# Patient Record
Sex: FEMALE | Race: BLACK OR AFRICAN AMERICAN | NOT HISPANIC OR LATINO | Employment: OTHER | URBAN - METROPOLITAN AREA
[De-identification: names, ages, dates, MRNs, and addresses within clinical notes are randomized per-mention and may not be internally consistent; named-entity substitution may affect disease eponyms.]

---

## 2023-11-07 DIAGNOSIS — M17.12 ARTHRITIS OF KNEE, LEFT: ICD-10-CM

## 2023-11-11 PROBLEM — M17.12 ARTHRITIS OF KNEE, LEFT: Status: ACTIVE | Noted: 2023-11-07

## 2023-11-14 ENCOUNTER — ANCILLARY PROCEDURE (OUTPATIENT)
Dept: RADIOLOGY | Facility: CLINIC | Age: 73
End: 2023-11-14
Payer: MEDICARE

## 2023-11-14 DIAGNOSIS — M17.12 ARTHRITIS OF KNEE, LEFT: ICD-10-CM

## 2023-11-14 PROCEDURE — 77073 BONE LENGTH STUDIES: CPT

## 2023-11-14 PROCEDURE — 77073 BONE LENGTH STUDIES: CPT | Performed by: RADIOLOGY

## 2023-11-14 PROCEDURE — 73564 X-RAY EXAM KNEE 4 OR MORE: CPT | Mod: LT,FY

## 2023-11-14 PROCEDURE — 73564 X-RAY EXAM KNEE 4 OR MORE: CPT | Mod: LEFT SIDE | Performed by: RADIOLOGY

## 2023-11-21 ENCOUNTER — TELEPHONE (OUTPATIENT)
Dept: ORTHOPEDIC SURGERY | Facility: HOSPITAL | Age: 73
End: 2023-11-21
Payer: MEDICARE

## 2023-11-21 NOTE — TELEPHONE ENCOUNTER
Thank you for taking my call today.  All questions were answered at the time of the call, but please feel free to reach out to me via [employer] or phone, [phone], with any new questions or concerns.       We confirmed that you opted to enroll in our [id number] program so your discharge prescriptions will be available to take home at the time of discharge.       We confirmed that your plan would be to Discharge Home same day (Rapid Recovery).    We confirmed that you have DME needed for recovery. And are working with your insurance company for approval of a cold therapy machine.     Use the provided body wash for 4 days before surgery and complete a 5th shower on the morning of surgery, this includes your body and hair.  Follow the directions as provided during preadmission testing.  The mouth wash will be used the night before and the morning of surgery.       As a reminder, if you do not hear from our team, please call [phone] between 2pm and 3pm the business day before your surgery to confirm your arrival time and details.        Please don't hesitate to reach out with additional questions or concerns.    Polina Diallo MBA, BSN, RN-BC  Orthopedic   [facility]  [phone]

## 2023-11-29 ENCOUNTER — ANESTHESIA EVENT (OUTPATIENT)
Dept: OPERATING ROOM | Facility: HOSPITAL | Age: 73
End: 2023-11-29
Payer: MEDICARE

## 2023-11-30 ENCOUNTER — PRE-ADMISSION TESTING (OUTPATIENT)
Dept: PREADMISSION TESTING | Facility: HOSPITAL | Age: 73
End: 2023-11-30
Payer: MEDICARE

## 2023-11-30 VITALS
OXYGEN SATURATION: 98 % | WEIGHT: 197.09 LBS | HEART RATE: 67 BPM | RESPIRATION RATE: 16 BRPM | HEIGHT: 70 IN | TEMPERATURE: 96.5 F | SYSTOLIC BLOOD PRESSURE: 158 MMHG | DIASTOLIC BLOOD PRESSURE: 84 MMHG | BODY MASS INDEX: 28.22 KG/M2

## 2023-11-30 DIAGNOSIS — Z01.818 PREOPERATIVE TESTING: Primary | ICD-10-CM

## 2023-11-30 LAB
ANION GAP SERPL CALC-SCNC: 14 MMOL/L (ref 10–20)
APPEARANCE UR: CLEAR
BASOPHILS # BLD AUTO: 0.03 X10*3/UL (ref 0–0.1)
BASOPHILS NFR BLD AUTO: 0.7 %
BILIRUB UR STRIP.AUTO-MCNC: NEGATIVE MG/DL
BUN SERPL-MCNC: 13 MG/DL (ref 6–23)
CALCIUM SERPL-MCNC: 9.6 MG/DL (ref 8.6–10.3)
CHLORIDE SERPL-SCNC: 101 MMOL/L (ref 98–107)
CO2 SERPL-SCNC: 28 MMOL/L (ref 21–32)
COLOR UR: YELLOW
CREAT SERPL-MCNC: 0.84 MG/DL (ref 0.5–1.05)
EOSINOPHIL # BLD AUTO: 0.08 X10*3/UL (ref 0–0.4)
EOSINOPHIL NFR BLD AUTO: 1.8 %
ERYTHROCYTE [DISTWIDTH] IN BLOOD BY AUTOMATED COUNT: 13.7 % (ref 11.5–14.5)
GFR SERPL CREATININE-BSD FRML MDRD: 74 ML/MIN/1.73M*2
GLUCOSE SERPL-MCNC: 94 MG/DL (ref 74–99)
GLUCOSE UR STRIP.AUTO-MCNC: NEGATIVE MG/DL
HCT VFR BLD AUTO: 42.8 % (ref 36–46)
HGB BLD-MCNC: 14.4 G/DL (ref 12–16)
HOLD SPECIMEN: NORMAL
IMM GRANULOCYTES # BLD AUTO: 0 X10*3/UL (ref 0–0.5)
IMM GRANULOCYTES NFR BLD AUTO: 0 % (ref 0–0.9)
KETONES UR STRIP.AUTO-MCNC: NEGATIVE MG/DL
LEUKOCYTE ESTERASE UR QL STRIP.AUTO: NEGATIVE
LYMPHOCYTES # BLD AUTO: 1.04 X10*3/UL (ref 0.8–3)
LYMPHOCYTES NFR BLD AUTO: 23.8 %
MCH RBC QN AUTO: 33.4 PG (ref 26–34)
MCHC RBC AUTO-ENTMCNC: 33.6 G/DL (ref 32–36)
MCV RBC AUTO: 99 FL (ref 80–100)
MONOCYTES # BLD AUTO: 0.64 X10*3/UL (ref 0.05–0.8)
MONOCYTES NFR BLD AUTO: 14.6 %
NEUTROPHILS # BLD AUTO: 2.58 X10*3/UL (ref 1.6–5.5)
NEUTROPHILS NFR BLD AUTO: 59.1 %
NITRITE UR QL STRIP.AUTO: NEGATIVE
NRBC BLD-RTO: NORMAL /100{WBCS}
PH UR STRIP.AUTO: 6 [PH]
PLATELET # BLD AUTO: 259 X10*3/UL (ref 150–450)
POTASSIUM SERPL-SCNC: 3.7 MMOL/L (ref 3.5–5.3)
PROT UR STRIP.AUTO-MCNC: NEGATIVE MG/DL
RBC # BLD AUTO: 4.31 X10*6/UL (ref 4–5.2)
RBC # UR STRIP.AUTO: ABNORMAL /UL
RBC #/AREA URNS AUTO: NORMAL /HPF
SODIUM SERPL-SCNC: 139 MMOL/L (ref 136–145)
SP GR UR STRIP.AUTO: 1.01
SQUAMOUS #/AREA URNS AUTO: NORMAL /HPF
UROBILINOGEN UR STRIP.AUTO-MCNC: 0.2 MG/DL
WBC # BLD AUTO: 4.4 X10*3/UL (ref 4.4–11.3)
WBC #/AREA URNS AUTO: NORMAL /HPF

## 2023-11-30 PROCEDURE — 80048 BASIC METABOLIC PNL TOTAL CA: CPT

## 2023-11-30 PROCEDURE — 36415 COLL VENOUS BLD VENIPUNCTURE: CPT

## 2023-11-30 PROCEDURE — 99204 OFFICE O/P NEW MOD 45 MIN: CPT | Performed by: NURSE PRACTITIONER

## 2023-11-30 PROCEDURE — 87081 CULTURE SCREEN ONLY: CPT

## 2023-11-30 PROCEDURE — 93005 ELECTROCARDIOGRAM TRACING: CPT

## 2023-11-30 PROCEDURE — 81001 URINALYSIS AUTO W/SCOPE: CPT

## 2023-11-30 PROCEDURE — 85025 COMPLETE CBC W/AUTO DIFF WBC: CPT

## 2023-11-30 RX ORDER — FLUTICASONE PROPIONATE 50 MCG
1 SPRAY, SUSPENSION (ML) NASAL DAILY PRN
COMMUNITY

## 2023-11-30 RX ORDER — BISMUTH SUBSALICYLATE 262 MG
1 TABLET,CHEWABLE ORAL DAILY
COMMUNITY

## 2023-11-30 RX ORDER — LOSARTAN POTASSIUM AND HYDROCHLOROTHIAZIDE 12.5; 5 MG/1; MG/1
1 TABLET ORAL DAILY
COMMUNITY

## 2023-11-30 RX ORDER — CHLORHEXIDINE GLUCONATE ORAL RINSE 1.2 MG/ML
15 SOLUTION DENTAL AS NEEDED
Qty: 30 ML | Refills: 0 | Status: SHIPPED | OUTPATIENT
Start: 2023-11-30 | End: 2023-12-15 | Stop reason: HOSPADM

## 2023-11-30 RX ORDER — IBUPROFEN 200 MG
2 TABLET ORAL EVERY 6 HOURS PRN
COMMUNITY

## 2023-11-30 RX ORDER — LORATADINE 10 MG/1
1 TABLET ORAL DAILY PRN
COMMUNITY

## 2023-11-30 RX ORDER — CHOLECALCIFEROL (VITAMIN D3) 50 MCG
1 TABLET ORAL DAILY
COMMUNITY

## 2023-11-30 ASSESSMENT — PAIN SCALES - GENERAL: PAINLEVEL_OUTOF10: 0 - NO PAIN

## 2023-11-30 ASSESSMENT — PAIN - FUNCTIONAL ASSESSMENT: PAIN_FUNCTIONAL_ASSESSMENT: 0-10

## 2023-11-30 ASSESSMENT — PAIN DESCRIPTION - DESCRIPTORS: DESCRIPTORS: ACHING

## 2023-11-30 NOTE — CPM/PAT H&P
CPM/PAT Evaluation     Megha Vivas is a 73 y.o. female   Chief Complaint: knee pain    HPI:  Patient is a 72 y/o alert and oriented female coming in for PAT for a scheduled Left Total Knee Arthroplasty on 12/1/23 w/ Dr. Simon.  The patient reports she has 3/10 achy knee pain that is exacerbated by walking.  She states wears a brace which helps.  She denies any swelling.  Reports her knee will give out if she does not wear her brace.  She has had an injection her that knee and did physical therapy.  Patient denies chest pain, SOB, LOPEZ and NVDC.  Patient also denies Hx: DVT/PE.  Current medications were reviewed and a presurgical mediation schedule was provided.  She has no questions at this time.   Past Medical History:   Diagnosis Date    Cataract     Hard to intubate     Hypertension     Vertigo       Past Surgical History:   Procedure Laterality Date    CATARACT EXTRACTION Left     HYSTERECTOMY      OTHER SURGICAL HISTORY      left victrectomy    OTHER SURGICAL HISTORY      bilateral breast reduction        Allergies   Allergen Reactions    Nickel Itching and Swelling    Pollen Extracts Cough and Runny nose        Vitals:    11/30/23 1208   BP: 158/84   Pulse: 67   Resp: 16   Temp: 35.8 °C (96.5 °F)   SpO2: 98%     Review of Systems   Musculoskeletal:         See hpi for details   All other systems reviewed and are negative.     Physical Exam  Vitals and nursing note reviewed.   Constitutional:       Appearance: Normal appearance.   HENT:      Head: Normocephalic and atraumatic.      Mouth/Throat:      Mouth: Mucous membranes are moist.      Pharynx: Oropharynx is clear.   Eyes:      Pupils: Pupils are equal, round, and reactive to light.   Cardiovascular:      Rate and Rhythm: Normal rate and regular rhythm.      Heart sounds: Normal heart sounds.      Comments: EKG today is NSR w/ left axis deviation.   Pulmonary:      Effort: Pulmonary effort is normal.      Breath sounds: Normal breath sounds.    Abdominal:      General: Bowel sounds are normal.   Musculoskeletal:         General: Normal range of motion.      Cervical back: Normal range of motion and neck supple.   Skin:     General: Skin is warm and dry.   Neurological:      Mental Status: She is alert and oriented to person, place, and time.   Psychiatric:         Mood and Affect: Mood normal.         Behavior: Behavior normal.         Thought Content: Thought content normal.         Judgment: Judgment normal.        PAT AIRWAY:   Airway:     Mallampati::  IV    TM distance::  >3 FB    Neck ROM::  Full  Has 1 dental implant   Has bridge and 2 missing teeth    Reports she is a difficult intubation    Assessment and Plan:   Arthritis left knee  Arthroplasty total knee  Managed with ibuprofen prn    Hypertension  Managed with losartan-hydrochlorothiazide 50-12.5mg daily    ASA II  RCRI - 0 points  Class I Risk 3.9%  SINDI - 2 points low Risk for TIESHA   NSQIP - Predicted length of stay 0 days  ARISCAT - 3 points Low Risk 1.6%  DASI 42.7 Points 7.99 Mets  BERYL - 0.2%  JHFRAT -4  points low  risk for falls  Clearance - not indicated  PAT Testing - CBC, CMP, UA, MRSA PCR, EKG    CHLORHEXIDINE .12% DENTAL RINSE E PRESCIRBED PER  INFECTION PREVENTION PROTOCOL. PATIENT EDUCATED     Face to Face patient contact time 20 minutes    ANTONIO Rowe-CNP 11/30/2023 12:31 PM

## 2023-11-30 NOTE — PREPROCEDURE INSTRUCTIONS
Medication List            Accurate as of November 30, 2023 12:27 PM. Always use your most recent med list.                cholecalciferol 50 MCG (2000 UT) tablet  Commonly known as: Vitamin D-3  Medication Adjustments for Surgery: Stop 7 days before surgery     fluticasone 50 mcg/actuation nasal spray  Commonly known as: Flonase  Medication Adjustments for Surgery: Take morning of surgery with sip of water, no other fluids     ibuprofen 200 mg tablet  Medication Adjustments for Surgery: Other (Comment)  Notes to patient: Stop 5 days prior to surgery     loratadine 10 mg tablet  Commonly known as: Claritin  Medication Adjustments for Surgery: Take morning of surgery with sip of water, no other fluids     losartan-hydrochlorothiazide 50-12.5 mg tablet  Commonly known as: Hyzaar  Medication Adjustments for Surgery: Stop 1 day before surgery     MSM ORAL  Medication Adjustments for Surgery: Stop 7 days before surgery     multivitamin tablet  Medication Adjustments for Surgery: Stop 7 days before surgery     TUMERIC-GING-OLIVE-OREG-CAPRYL ORAL  Medication Adjustments for Surgery: Stop 7 days before surgery                              NPO Instructions:    Do not eat any food after midnight the night before your surgery/procedure.    Additional Instructions:     Seven/Six Days before Surgery:  Review your medication instructions, stop indicated medications  Five Days before Surgery:  Review your medication instructions, stop indicated medications  Three Days before Surgery:  Review your medication instructions, stop indicated medications  The Day before Surgery:  Review your medication instructions, stop indicated medications  You will be contacted regarding the time of your arrival to facility and surgery time  Do not eat any food after Midnight  Day of Surgery:  Review your medication instructions, take indicated medications  Wear  comfortable loose fitting clothing  Do not use moisturizers, creams, lotions or  perfume  All jewelry and valuables should be left at home      Follow written instructions given and explained for CHG wash and dental rinse.

## 2023-11-30 NOTE — H&P (VIEW-ONLY)
CPM/PAT Evaluation     Megha Vivas is a 73 y.o. female   Chief Complaint: knee pain    HPI:  Patient is a 74 y/o alert and oriented female coming in for PAT for a scheduled Left Total Knee Arthroplasty on 12/1/23 w/ Dr. Simon.  The patient reports she has 3/10 achy knee pain that is exacerbated by walking.  She states wears a brace which helps.  She denies any swelling.  Reports her knee will give out if she does not wear her brace.  She has had an injection her that knee and did physical therapy.  Patient denies chest pain, SOB, LOPEZ and NVDC.  Patient also denies Hx: DVT/PE.  Current medications were reviewed and a presurgical mediation schedule was provided.  She has no questions at this time.   Past Medical History:   Diagnosis Date    Cataract     Hard to intubate     Hypertension     Vertigo       Past Surgical History:   Procedure Laterality Date    CATARACT EXTRACTION Left     HYSTERECTOMY      OTHER SURGICAL HISTORY      left victrectomy    OTHER SURGICAL HISTORY      bilateral breast reduction        Allergies   Allergen Reactions    Nickel Itching and Swelling    Pollen Extracts Cough and Runny nose        Vitals:    11/30/23 1208   BP: 158/84   Pulse: 67   Resp: 16   Temp: 35.8 °C (96.5 °F)   SpO2: 98%     Review of Systems   Musculoskeletal:         See hpi for details   All other systems reviewed and are negative.     Physical Exam  Vitals and nursing note reviewed.   Constitutional:       Appearance: Normal appearance.   HENT:      Head: Normocephalic and atraumatic.      Mouth/Throat:      Mouth: Mucous membranes are moist.      Pharynx: Oropharynx is clear.   Eyes:      Pupils: Pupils are equal, round, and reactive to light.   Cardiovascular:      Rate and Rhythm: Normal rate and regular rhythm.      Heart sounds: Normal heart sounds.      Comments: EKG today is NSR w/ left axis deviation.   Pulmonary:      Effort: Pulmonary effort is normal.      Breath sounds: Normal breath sounds.    Abdominal:      General: Bowel sounds are normal.   Musculoskeletal:         General: Normal range of motion.      Cervical back: Normal range of motion and neck supple.   Skin:     General: Skin is warm and dry.   Neurological:      Mental Status: She is alert and oriented to person, place, and time.   Psychiatric:         Mood and Affect: Mood normal.         Behavior: Behavior normal.         Thought Content: Thought content normal.         Judgment: Judgment normal.        PAT AIRWAY:   Airway:     Mallampati::  IV    TM distance::  >3 FB    Neck ROM::  Full  Has 1 dental implant   Has bridge and 2 missing teeth    Reports she is a difficult intubation    Assessment and Plan:   Arthritis left knee  Arthroplasty total knee  Managed with ibuprofen prn    Hypertension  Managed with losartan-hydrochlorothiazide 50-12.5mg daily    ASA II  RCRI - 0 points  Class I Risk 3.9%  SINDI - 2 points low Risk for TIESHA   NSQIP - Predicted length of stay 0 days  ARISCAT - 3 points Low Risk 1.6%  DASI 42.7 Points 7.99 Mets  BERYL - 0.2%  JHFRAT -4  points low  risk for falls  Clearance - not indicated  PAT Testing - CBC, CMP, UA, MRSA PCR, EKG    CHLORHEXIDINE .12% DENTAL RINSE E PRESCIRBED PER  INFECTION PREVENTION PROTOCOL. PATIENT EDUCATED     Face to Face patient contact time 20 minutes    ANTONIO Rowe-CNP 11/30/2023 12:31 PM

## 2023-12-01 ENCOUNTER — ANESTHESIA (OUTPATIENT)
Dept: OPERATING ROOM | Facility: HOSPITAL | Age: 73
End: 2023-12-01
Payer: MEDICARE

## 2023-12-01 LAB
ATRIAL RATE: 61 BPM
P AXIS: 48 DEGREES
P OFFSET: 198 MS
P ONSET: 145 MS
PR INTERVAL: 162 MS
Q ONSET: 226 MS
QRS COUNT: 10 BEATS
QRS DURATION: 76 MS
QT INTERVAL: 410 MS
QTC CALCULATION(BAZETT): 412 MS
QTC FREDERICIA: 412 MS
R AXIS: -32 DEGREES
T AXIS: 32 DEGREES
T OFFSET: 431 MS
VENTRICULAR RATE: 61 BPM

## 2023-12-02 LAB — STAPHYLOCOCCUS SPEC CULT: NORMAL

## 2023-12-04 DIAGNOSIS — M17.12 PRIMARY OSTEOARTHRITIS OF LEFT KNEE: Primary | ICD-10-CM

## 2023-12-13 ENCOUNTER — ANESTHESIA EVENT (OUTPATIENT)
Dept: OPERATING ROOM | Facility: HOSPITAL | Age: 73
End: 2023-12-13
Payer: MEDICARE

## 2023-12-14 RX ORDER — SENNOSIDES 8.6 MG/1
1 TABLET ORAL DAILY
Qty: 15 TABLET | Refills: 0 | Status: SHIPPED | OUTPATIENT
Start: 2023-12-14 | End: 2023-12-30

## 2023-12-14 RX ORDER — OXYCODONE HYDROCHLORIDE 5 MG/1
5-10 TABLET ORAL EVERY 6 HOURS PRN
Qty: 40 TABLET | Refills: 0 | Status: SHIPPED | OUTPATIENT
Start: 2023-12-14 | End: 2023-12-22

## 2023-12-14 RX ORDER — DOCUSATE SODIUM 100 MG/1
100 CAPSULE, LIQUID FILLED ORAL 2 TIMES DAILY
Qty: 30 CAPSULE | Refills: 0 | Status: SHIPPED | OUTPATIENT
Start: 2023-12-14 | End: 2023-12-30

## 2023-12-14 RX ORDER — ACETAMINOPHEN 500 MG
1000 TABLET ORAL EVERY 8 HOURS
Qty: 60 TABLET | Refills: 1 | Status: SHIPPED | OUTPATIENT
Start: 2023-12-14 | End: 2024-01-04

## 2023-12-14 RX ORDER — NAPROXEN SODIUM 220 MG/1
81 TABLET, FILM COATED ORAL 2 TIMES DAILY
Qty: 60 TABLET | Refills: 0 | Status: SHIPPED | OUTPATIENT
Start: 2023-12-14 | End: 2024-01-14

## 2023-12-14 RX ORDER — ONDANSETRON 4 MG/1
4 TABLET, FILM COATED ORAL EVERY 8 HOURS PRN
Qty: 20 TABLET | Refills: 0 | Status: SHIPPED | OUTPATIENT
Start: 2023-12-14

## 2023-12-14 RX ORDER — TRAMADOL HYDROCHLORIDE 50 MG/1
50-100 TABLET ORAL EVERY 6 HOURS PRN
Qty: 40 TABLET | Refills: 0 | Status: SHIPPED | OUTPATIENT
Start: 2023-12-14 | End: 2023-12-21

## 2023-12-14 RX ORDER — CEFADROXIL 500 MG/1
500 CAPSULE ORAL 2 TIMES DAILY
Qty: 10 CAPSULE | Refills: 0 | Status: SHIPPED | OUTPATIENT
Start: 2023-12-14 | End: 2023-12-20

## 2023-12-14 RX ORDER — PANTOPRAZOLE SODIUM 40 MG/1
40 TABLET, DELAYED RELEASE ORAL
Qty: 30 TABLET | Refills: 0 | Status: SHIPPED | OUTPATIENT
Start: 2023-12-14 | End: 2024-01-14

## 2023-12-15 ENCOUNTER — PHARMACY VISIT (OUTPATIENT)
Dept: PHARMACY | Facility: CLINIC | Age: 73
End: 2023-12-15
Payer: MEDICARE

## 2023-12-15 ENCOUNTER — ANESTHESIA (OUTPATIENT)
Dept: OPERATING ROOM | Facility: HOSPITAL | Age: 73
End: 2023-12-15
Payer: MEDICARE

## 2023-12-15 ENCOUNTER — APPOINTMENT (OUTPATIENT)
Dept: RADIOLOGY | Facility: HOSPITAL | Age: 73
End: 2023-12-15
Payer: MEDICARE

## 2023-12-15 ENCOUNTER — HOSPITAL ENCOUNTER (OUTPATIENT)
Facility: HOSPITAL | Age: 73
Setting detail: OUTPATIENT SURGERY
Discharge: HOME | End: 2023-12-15
Attending: STUDENT IN AN ORGANIZED HEALTH CARE EDUCATION/TRAINING PROGRAM | Admitting: STUDENT IN AN ORGANIZED HEALTH CARE EDUCATION/TRAINING PROGRAM
Payer: MEDICARE

## 2023-12-15 VITALS
HEIGHT: 69 IN | WEIGHT: 194.45 LBS | DIASTOLIC BLOOD PRESSURE: 71 MMHG | TEMPERATURE: 97.3 F | BODY MASS INDEX: 28.8 KG/M2 | OXYGEN SATURATION: 96 % | HEART RATE: 56 BPM | RESPIRATION RATE: 12 BRPM | SYSTOLIC BLOOD PRESSURE: 133 MMHG

## 2023-12-15 DIAGNOSIS — M17.12 PRIMARY OSTEOARTHRITIS OF LEFT KNEE: Primary | ICD-10-CM

## 2023-12-15 PROCEDURE — C1776 JOINT DEVICE (IMPLANTABLE): HCPCS | Performed by: STUDENT IN AN ORGANIZED HEALTH CARE EDUCATION/TRAINING PROGRAM

## 2023-12-15 PROCEDURE — 7100000009 HC PHASE TWO TIME - INITIAL BASE CHARGE: Performed by: STUDENT IN AN ORGANIZED HEALTH CARE EDUCATION/TRAINING PROGRAM

## 2023-12-15 PROCEDURE — C1713 ANCHOR/SCREW BN/BN,TIS/BN: HCPCS | Performed by: STUDENT IN AN ORGANIZED HEALTH CARE EDUCATION/TRAINING PROGRAM

## 2023-12-15 PROCEDURE — 2720000007 HC OR 272 NO HCPCS: Performed by: STUDENT IN AN ORGANIZED HEALTH CARE EDUCATION/TRAINING PROGRAM

## 2023-12-15 PROCEDURE — RXMED WILLOW AMBULATORY MEDICATION CHARGE

## 2023-12-15 PROCEDURE — 2500000001 HC RX 250 WO HCPCS SELF ADMINISTERED DRUGS (ALT 637 FOR MEDICARE OP): Performed by: ANESTHESIOLOGY

## 2023-12-15 PROCEDURE — 2500000001 HC RX 250 WO HCPCS SELF ADMINISTERED DRUGS (ALT 637 FOR MEDICARE OP): Performed by: STUDENT IN AN ORGANIZED HEALTH CARE EDUCATION/TRAINING PROGRAM

## 2023-12-15 PROCEDURE — 64450 NJX AA&/STRD OTHER PN/BRANCH: CPT | Performed by: ANESTHESIOLOGY

## 2023-12-15 PROCEDURE — 97110 THERAPEUTIC EXERCISES: CPT | Mod: GP

## 2023-12-15 PROCEDURE — 2500000004 HC RX 250 GENERAL PHARMACY W/ HCPCS (ALT 636 FOR OP/ED): Performed by: STUDENT IN AN ORGANIZED HEALTH CARE EDUCATION/TRAINING PROGRAM

## 2023-12-15 PROCEDURE — 3600000018 HC OR TIME - INITIAL BASE CHARGE - PROCEDURE LEVEL SIX: Performed by: STUDENT IN AN ORGANIZED HEALTH CARE EDUCATION/TRAINING PROGRAM

## 2023-12-15 PROCEDURE — 97161 PT EVAL LOW COMPLEX 20 MIN: CPT | Mod: GP

## 2023-12-15 PROCEDURE — 97530 THERAPEUTIC ACTIVITIES: CPT | Mod: GP

## 2023-12-15 PROCEDURE — 64447 NJX AA&/STRD FEMORAL NRV IMG: CPT | Performed by: ANESTHESIOLOGY

## 2023-12-15 PROCEDURE — 3600000017 HC OR TIME - EACH INCREMENTAL 1 MINUTE - PROCEDURE LEVEL SIX: Performed by: STUDENT IN AN ORGANIZED HEALTH CARE EDUCATION/TRAINING PROGRAM

## 2023-12-15 PROCEDURE — A27447 PR TOTAL KNEE ARTHROPLASTY: Performed by: ANESTHESIOLOGIST ASSISTANT

## 2023-12-15 PROCEDURE — 27447 TOTAL KNEE ARTHROPLASTY: CPT | Performed by: STUDENT IN AN ORGANIZED HEALTH CARE EDUCATION/TRAINING PROGRAM

## 2023-12-15 PROCEDURE — A4649 SURGICAL SUPPLIES: HCPCS | Performed by: STUDENT IN AN ORGANIZED HEALTH CARE EDUCATION/TRAINING PROGRAM

## 2023-12-15 PROCEDURE — 73560 X-RAY EXAM OF KNEE 1 OR 2: CPT | Mod: LT,FY

## 2023-12-15 PROCEDURE — 2500000005 HC RX 250 GENERAL PHARMACY W/O HCPCS: Performed by: STUDENT IN AN ORGANIZED HEALTH CARE EDUCATION/TRAINING PROGRAM

## 2023-12-15 PROCEDURE — 2500000005 HC RX 250 GENERAL PHARMACY W/O HCPCS: Performed by: ANESTHESIOLOGIST ASSISTANT

## 2023-12-15 PROCEDURE — 99100 ANES PT EXTEME AGE<1 YR&>70: CPT | Performed by: ANESTHESIOLOGY

## 2023-12-15 PROCEDURE — A27447 PR TOTAL KNEE ARTHROPLASTY: Performed by: ANESTHESIOLOGY

## 2023-12-15 PROCEDURE — 7100000010 HC PHASE TWO TIME - EACH INCREMENTAL 1 MINUTE: Performed by: STUDENT IN AN ORGANIZED HEALTH CARE EDUCATION/TRAINING PROGRAM

## 2023-12-15 PROCEDURE — 3700000001 HC GENERAL ANESTHESIA TIME - INITIAL BASE CHARGE: Performed by: STUDENT IN AN ORGANIZED HEALTH CARE EDUCATION/TRAINING PROGRAM

## 2023-12-15 PROCEDURE — 3700000002 HC GENERAL ANESTHESIA TIME - EACH INCREMENTAL 1 MINUTE: Performed by: STUDENT IN AN ORGANIZED HEALTH CARE EDUCATION/TRAINING PROGRAM

## 2023-12-15 PROCEDURE — 2500000004 HC RX 250 GENERAL PHARMACY W/ HCPCS (ALT 636 FOR OP/ED): Performed by: ANESTHESIOLOGIST ASSISTANT

## 2023-12-15 PROCEDURE — 97116 GAIT TRAINING THERAPY: CPT | Mod: GP

## 2023-12-15 PROCEDURE — 2500000004 HC RX 250 GENERAL PHARMACY W/ HCPCS (ALT 636 FOR OP/ED): Performed by: ANESTHESIOLOGY

## 2023-12-15 PROCEDURE — 2780000003 HC OR 278 NO HCPCS: Performed by: STUDENT IN AN ORGANIZED HEALTH CARE EDUCATION/TRAINING PROGRAM

## 2023-12-15 DEVICE — IMPLANTABLE DEVICE
Type: IMPLANTABLE DEVICE | Site: KNEE | Status: FUNCTIONAL
Brand: PERSONA®

## 2023-12-15 DEVICE — IMPLANTABLE DEVICE
Type: IMPLANTABLE DEVICE | Site: KNEE | Status: FUNCTIONAL
Brand: PERSONA® NATURAL TIBIA®

## 2023-12-15 DEVICE — IMPLANTABLE DEVICE
Type: IMPLANTABLE DEVICE | Site: KNEE | Status: FUNCTIONAL
Brand: PERSONA™

## 2023-12-15 DEVICE — TOBRA FULL DOSE ANTIBIOTIC BONE CEMENT, 10 PACK CATALOG NUMBER IS 6197-9-010
Type: IMPLANTABLE DEVICE | Site: KNEE | Status: FUNCTIONAL
Brand: SIMPLEX

## 2023-12-15 DEVICE — IMPLANTABLE DEVICE
Type: IMPLANTABLE DEVICE | Site: KNEE | Status: FUNCTIONAL
Brand: NEXGEN®

## 2023-12-15 RX ORDER — ONDANSETRON HYDROCHLORIDE 2 MG/ML
4 INJECTION, SOLUTION INTRAVENOUS ONCE AS NEEDED
Status: DISCONTINUED | OUTPATIENT
Start: 2023-12-15 | End: 2023-12-15 | Stop reason: HOSPADM

## 2023-12-15 RX ORDER — KETOROLAC TROMETHAMINE 30 MG/ML
INJECTION, SOLUTION INTRAMUSCULAR; INTRAVENOUS AS NEEDED
Status: DISCONTINUED | OUTPATIENT
Start: 2023-12-15 | End: 2023-12-15

## 2023-12-15 RX ORDER — TRANEXAMIC ACID 100 MG/ML
INJECTION, SOLUTION INTRAVENOUS AS NEEDED
Status: DISCONTINUED | OUTPATIENT
Start: 2023-12-15 | End: 2023-12-15

## 2023-12-15 RX ORDER — FENTANYL CITRATE 50 UG/ML
INJECTION, SOLUTION INTRAMUSCULAR; INTRAVENOUS AS NEEDED
Status: DISCONTINUED | OUTPATIENT
Start: 2023-12-15 | End: 2023-12-15

## 2023-12-15 RX ORDER — NALOXONE HYDROCHLORIDE 0.4 MG/ML
0.2 INJECTION, SOLUTION INTRAMUSCULAR; INTRAVENOUS; SUBCUTANEOUS EVERY 5 MIN PRN
Status: CANCELLED | OUTPATIENT
Start: 2023-12-15

## 2023-12-15 RX ORDER — PANTOPRAZOLE SODIUM 40 MG/1
40 TABLET, DELAYED RELEASE ORAL
Status: CANCELLED | OUTPATIENT
Start: 2023-12-16 | End: 2024-01-06

## 2023-12-15 RX ORDER — LABETALOL HYDROCHLORIDE 5 MG/ML
5 INJECTION, SOLUTION INTRAVENOUS EVERY 5 MIN PRN
Status: DISCONTINUED | OUTPATIENT
Start: 2023-12-15 | End: 2023-12-15 | Stop reason: HOSPADM

## 2023-12-15 RX ORDER — CELECOXIB 200 MG/1
200 CAPSULE ORAL ONCE
Status: COMPLETED | OUTPATIENT
Start: 2023-12-15 | End: 2023-12-15

## 2023-12-15 RX ORDER — FENTANYL CITRATE 50 UG/ML
50 INJECTION, SOLUTION INTRAMUSCULAR; INTRAVENOUS EVERY 5 MIN PRN
Status: DISCONTINUED | OUTPATIENT
Start: 2023-12-15 | End: 2023-12-15 | Stop reason: HOSPADM

## 2023-12-15 RX ORDER — OXYCODONE HYDROCHLORIDE 5 MG/1
5 TABLET ORAL EVERY 6 HOURS PRN
Status: CANCELLED | OUTPATIENT
Start: 2023-12-15

## 2023-12-15 RX ORDER — POLYETHYLENE GLYCOL 3350 17 G/17G
17 POWDER, FOR SOLUTION ORAL DAILY
Status: CANCELLED | OUTPATIENT
Start: 2023-12-15

## 2023-12-15 RX ORDER — KETOROLAC TROMETHAMINE 15 MG/ML
15 INJECTION, SOLUTION INTRAMUSCULAR; INTRAVENOUS EVERY 6 HOURS
Status: CANCELLED | OUTPATIENT
Start: 2023-12-15 | End: 2023-12-16

## 2023-12-15 RX ORDER — DEXAMETHASONE SODIUM PHOSPHATE 4 MG/ML
INJECTION, SOLUTION INTRA-ARTICULAR; INTRALESIONAL; INTRAMUSCULAR; INTRAVENOUS; SOFT TISSUE AS NEEDED
Status: DISCONTINUED | OUTPATIENT
Start: 2023-12-15 | End: 2023-12-15

## 2023-12-15 RX ORDER — DOCUSATE SODIUM 100 MG/1
100 CAPSULE, LIQUID FILLED ORAL 2 TIMES DAILY
Status: CANCELLED | OUTPATIENT
Start: 2023-12-15

## 2023-12-15 RX ORDER — ONDANSETRON HYDROCHLORIDE 2 MG/ML
INJECTION, SOLUTION INTRAVENOUS AS NEEDED
Status: DISCONTINUED | OUTPATIENT
Start: 2023-12-15 | End: 2023-12-15

## 2023-12-15 RX ORDER — CEFAZOLIN SODIUM 2 G/100ML
2 INJECTION, SOLUTION INTRAVENOUS ONCE
Status: COMPLETED | OUTPATIENT
Start: 2023-12-15 | End: 2023-12-15

## 2023-12-15 RX ORDER — FERROUS SULFATE 325(65) MG
65 TABLET ORAL
Status: CANCELLED | OUTPATIENT
Start: 2023-12-15

## 2023-12-15 RX ORDER — OXYCODONE HYDROCHLORIDE 5 MG/1
10 TABLET ORAL EVERY 4 HOURS PRN
Status: CANCELLED | OUTPATIENT
Start: 2023-12-15

## 2023-12-15 RX ORDER — ACETAMINOPHEN 325 MG/1
650 TABLET ORAL ONCE
Status: COMPLETED | OUTPATIENT
Start: 2023-12-15 | End: 2023-12-15

## 2023-12-15 RX ORDER — ROPIVACAINE/EPI/CLONIDINE/KET 2.46-0.005
SYRINGE (ML) INJECTION AS NEEDED
Status: DISCONTINUED | OUTPATIENT
Start: 2023-12-15 | End: 2023-12-15 | Stop reason: HOSPADM

## 2023-12-15 RX ORDER — ACETAMINOPHEN 325 MG/1
975 TABLET ORAL EVERY 8 HOURS
Status: CANCELLED | OUTPATIENT
Start: 2023-12-15

## 2023-12-15 RX ORDER — TRAMADOL HYDROCHLORIDE 50 MG/1
50 TABLET ORAL EVERY 6 HOURS PRN
Status: CANCELLED | OUTPATIENT
Start: 2023-12-15

## 2023-12-15 RX ORDER — IPRATROPIUM BROMIDE 0.5 MG/2.5ML
500 SOLUTION RESPIRATORY (INHALATION) EVERY 30 MIN PRN
Status: DISCONTINUED | OUTPATIENT
Start: 2023-12-15 | End: 2023-12-15 | Stop reason: HOSPADM

## 2023-12-15 RX ORDER — GLYCOPYRROLATE 0.2 MG/ML
INJECTION INTRAMUSCULAR; INTRAVENOUS AS NEEDED
Status: DISCONTINUED | OUTPATIENT
Start: 2023-12-15 | End: 2023-12-15

## 2023-12-15 RX ORDER — DIPHENHYDRAMINE HCL 12.5MG/5ML
12.5 LIQUID (ML) ORAL EVERY 6 HOURS PRN
Status: CANCELLED | OUTPATIENT
Start: 2023-12-15

## 2023-12-15 RX ORDER — SODIUM CHLORIDE, SODIUM LACTATE, POTASSIUM CHLORIDE, CALCIUM CHLORIDE 600; 310; 30; 20 MG/100ML; MG/100ML; MG/100ML; MG/100ML
100 INJECTION, SOLUTION INTRAVENOUS CONTINUOUS
Status: DISCONTINUED | OUTPATIENT
Start: 2023-12-15 | End: 2023-12-15 | Stop reason: HOSPADM

## 2023-12-15 RX ORDER — SCOLOPAMINE TRANSDERMAL SYSTEM 1 MG/1
1 PATCH, EXTENDED RELEASE TRANSDERMAL
Status: DISCONTINUED | OUTPATIENT
Start: 2023-12-15 | End: 2023-12-15 | Stop reason: HOSPADM

## 2023-12-15 RX ORDER — LIDOCAINE HYDROCHLORIDE 10 MG/ML
INJECTION, SOLUTION EPIDURAL; INFILTRATION; INTRACAUDAL; PERINEURAL AS NEEDED
Status: DISCONTINUED | OUTPATIENT
Start: 2023-12-15 | End: 2023-12-15

## 2023-12-15 RX ORDER — DIPHENHYDRAMINE HYDROCHLORIDE 50 MG/ML
12.5 INJECTION INTRAMUSCULAR; INTRAVENOUS ONCE AS NEEDED
Status: DISCONTINUED | OUTPATIENT
Start: 2023-12-15 | End: 2023-12-15 | Stop reason: HOSPADM

## 2023-12-15 RX ORDER — MIDAZOLAM HYDROCHLORIDE 1 MG/ML
2 INJECTION, SOLUTION INTRAMUSCULAR; INTRAVENOUS ONCE
Status: COMPLETED | OUTPATIENT
Start: 2023-12-15 | End: 2023-12-15

## 2023-12-15 RX ORDER — SODIUM CHLORIDE, SODIUM LACTATE, POTASSIUM CHLORIDE, CALCIUM CHLORIDE 600; 310; 30; 20 MG/100ML; MG/100ML; MG/100ML; MG/100ML
100 INJECTION, SOLUTION INTRAVENOUS CONTINUOUS
Status: CANCELLED | OUTPATIENT
Start: 2023-12-15 | End: 2023-12-16

## 2023-12-15 RX ORDER — SODIUM CHLORIDE, SODIUM LACTATE, POTASSIUM CHLORIDE, CALCIUM CHLORIDE 600; 310; 30; 20 MG/100ML; MG/100ML; MG/100ML; MG/100ML
40 INJECTION, SOLUTION INTRAVENOUS CONTINUOUS
Status: DISCONTINUED | OUTPATIENT
Start: 2023-12-15 | End: 2023-12-15 | Stop reason: HOSPADM

## 2023-12-15 RX ORDER — PROPOFOL 10 MG/ML
INJECTION, EMULSION INTRAVENOUS AS NEEDED
Status: DISCONTINUED | OUTPATIENT
Start: 2023-12-15 | End: 2023-12-15

## 2023-12-15 RX ORDER — ALBUTEROL SULFATE 0.83 MG/ML
2.5 SOLUTION RESPIRATORY (INHALATION) EVERY 30 MIN PRN
Status: DISCONTINUED | OUTPATIENT
Start: 2023-12-15 | End: 2023-12-15 | Stop reason: HOSPADM

## 2023-12-15 RX ORDER — MEPERIDINE HYDROCHLORIDE 25 MG/ML
12.5 INJECTION INTRAMUSCULAR; INTRAVENOUS; SUBCUTANEOUS EVERY 10 MIN PRN
Status: COMPLETED | OUTPATIENT
Start: 2023-12-15 | End: 2023-12-15

## 2023-12-15 RX ORDER — FENTANYL CITRATE 50 UG/ML
100 INJECTION, SOLUTION INTRAMUSCULAR; INTRAVENOUS ONCE
Status: COMPLETED | OUTPATIENT
Start: 2023-12-15 | End: 2023-12-15

## 2023-12-15 RX ORDER — TRANEXAMIC ACID 650 MG/1
1950 TABLET ORAL ONCE
Status: DISCONTINUED | OUTPATIENT
Start: 2023-12-15 | End: 2023-12-15 | Stop reason: HOSPADM

## 2023-12-15 RX ORDER — ASPIRIN 81 MG/1
81 TABLET ORAL 2 TIMES DAILY
Status: CANCELLED | OUTPATIENT
Start: 2023-12-15

## 2023-12-15 RX ORDER — OXYCODONE HYDROCHLORIDE 5 MG/1
5 TABLET ORAL ONCE
Status: COMPLETED | OUTPATIENT
Start: 2023-12-15 | End: 2023-12-15

## 2023-12-15 RX ORDER — CEFAZOLIN SODIUM 2 G/100ML
2 INJECTION, SOLUTION INTRAVENOUS EVERY 8 HOURS
Status: CANCELLED | OUTPATIENT
Start: 2023-12-15 | End: 2023-12-16

## 2023-12-15 RX ORDER — BUPIVACAINE HYDROCHLORIDE 7.5 MG/ML
INJECTION, SOLUTION INTRASPINAL AS NEEDED
Status: DISCONTINUED | OUTPATIENT
Start: 2023-12-15 | End: 2023-12-15

## 2023-12-15 RX ADMIN — DEXAMETHASONE SODIUM PHOSPHATE 8 MG: 4 INJECTION, SOLUTION INTRAMUSCULAR; INTRAVENOUS at 13:21

## 2023-12-15 RX ADMIN — FENTANYL CITRATE 50 MCG: 50 INJECTION INTRAMUSCULAR; INTRAVENOUS at 12:44

## 2023-12-15 RX ADMIN — ONDANSETRON 4 MG: 2 INJECTION INTRAMUSCULAR; INTRAVENOUS at 13:21

## 2023-12-15 RX ADMIN — FENTANYL CITRATE 50 MCG: 50 INJECTION INTRAMUSCULAR; INTRAVENOUS at 11:15

## 2023-12-15 RX ADMIN — CEFAZOLIN SODIUM 2 G: 2 INJECTION, SOLUTION INTRAVENOUS at 11:24

## 2023-12-15 RX ADMIN — OXYCODONE HYDROCHLORIDE 5 MG: 5 TABLET ORAL at 15:55

## 2023-12-15 RX ADMIN — TRANEXAMIC ACID 1000 MG: 1 INJECTION, SOLUTION INTRAVENOUS at 11:27

## 2023-12-15 RX ADMIN — SODIUM CHLORIDE, SODIUM LACTATE, POTASSIUM CHLORIDE, AND CALCIUM CHLORIDE: 600; 310; 30; 20 INJECTION, SOLUTION INTRAVENOUS at 12:59

## 2023-12-15 RX ADMIN — PROPOFOL 500 MG: 10 INJECTION, EMULSION INTRAVENOUS at 11:25

## 2023-12-15 RX ADMIN — FENTANYL CITRATE 100 MCG: 50 INJECTION INTRAMUSCULAR; INTRAVENOUS at 10:38

## 2023-12-15 RX ADMIN — MEPERIDINE HYDROCHLORIDE 12.5 MG: 25 INJECTION INTRAMUSCULAR; INTRAVENOUS; SUBCUTANEOUS at 14:13

## 2023-12-15 RX ADMIN — KETOROLAC TROMETHAMINE 30 MG: 30 INJECTION, SOLUTION INTRAMUSCULAR at 13:21

## 2023-12-15 RX ADMIN — ACETAMINOPHEN 650 MG: 325 TABLET ORAL at 09:32

## 2023-12-15 RX ADMIN — BUPIVACAINE HYDROCHLORIDE IN DEXTROSE 1.4 ML: 7.5 INJECTION, SOLUTION SUBARACHNOID at 11:24

## 2023-12-15 RX ADMIN — SODIUM CHLORIDE, SODIUM LACTATE, POTASSIUM CHLORIDE, AND CALCIUM CHLORIDE 100 ML/HR: 600; 310; 30; 20 INJECTION, SOLUTION INTRAVENOUS at 09:46

## 2023-12-15 RX ADMIN — LIDOCAINE HYDROCHLORIDE 5 ML: 10 INJECTION, SOLUTION EPIDURAL; INFILTRATION; INTRACAUDAL; PERINEURAL at 11:25

## 2023-12-15 RX ADMIN — CELECOXIB 200 MG: 200 CAPSULE ORAL at 09:32

## 2023-12-15 RX ADMIN — TRANEXAMIC ACID 1000 MG: 1 INJECTION, SOLUTION INTRAVENOUS at 13:21

## 2023-12-15 RX ADMIN — PROPOFOL 200 MG: 10 INJECTION, EMULSION INTRAVENOUS at 12:40

## 2023-12-15 RX ADMIN — GLYCOPYRROLATE 0.2 MG: 0.2 INJECTION INTRAMUSCULAR; INTRAVENOUS at 12:51

## 2023-12-15 RX ADMIN — PROPOFOL 50 MG: 10 INJECTION, EMULSION INTRAVENOUS at 13:36

## 2023-12-15 RX ADMIN — POVIDONE-IODINE 1 APPLICATION: 5 SOLUTION TOPICAL at 09:31

## 2023-12-15 RX ADMIN — PROPOFOL 200 MG: 10 INJECTION, EMULSION INTRAVENOUS at 13:05

## 2023-12-15 RX ADMIN — MIDAZOLAM 2 MG: 1 INJECTION INTRAMUSCULAR; INTRAVENOUS at 10:38

## 2023-12-15 RX ADMIN — MEPERIDINE HYDROCHLORIDE 12.5 MG: 25 INJECTION INTRAMUSCULAR; INTRAVENOUS; SUBCUTANEOUS at 14:18

## 2023-12-15 SDOH — HEALTH STABILITY: MENTAL HEALTH: CURRENT SMOKER: 0

## 2023-12-15 ASSESSMENT — PAIN SCALES - GENERAL
PAINLEVEL_OUTOF10: 6
PAINLEVEL_OUTOF10: 4
PAINLEVEL_OUTOF10: 0 - NO PAIN
PAINLEVEL_OUTOF10: 6
PAINLEVEL_OUTOF10: 3
PAINLEVEL_OUTOF10: 0 - NO PAIN
PAINLEVEL_OUTOF10: 5 - MODERATE PAIN
PAINLEVEL_OUTOF10: 0 - NO PAIN
PAINLEVEL_OUTOF10: 0 - NO PAIN
PAIN_LEVEL: 0
PAINLEVEL_OUTOF10: 6
PAINLEVEL_OUTOF10: 2
PAINLEVEL_OUTOF10: 0 - NO PAIN
PAINLEVEL_OUTOF10: 0 - NO PAIN

## 2023-12-15 ASSESSMENT — COGNITIVE AND FUNCTIONAL STATUS - GENERAL
MOVING TO AND FROM BED TO CHAIR: A LITTLE
WALKING IN HOSPITAL ROOM: A LITTLE
CLIMB 3 TO 5 STEPS WITH RAILING: A LITTLE
MOBILITY SCORE: 20
STANDING UP FROM CHAIR USING ARMS: A LITTLE

## 2023-12-15 ASSESSMENT — PAIN - FUNCTIONAL ASSESSMENT
PAIN_FUNCTIONAL_ASSESSMENT: 0-10

## 2023-12-15 ASSESSMENT — COLUMBIA-SUICIDE SEVERITY RATING SCALE - C-SSRS
2. HAVE YOU ACTUALLY HAD ANY THOUGHTS OF KILLING YOURSELF?: NO
6. HAVE YOU EVER DONE ANYTHING, STARTED TO DO ANYTHING, OR PREPARED TO DO ANYTHING TO END YOUR LIFE?: NO
1. IN THE PAST MONTH, HAVE YOU WISHED YOU WERE DEAD OR WISHED YOU COULD GO TO SLEEP AND NOT WAKE UP?: NO

## 2023-12-15 ASSESSMENT — ACTIVITIES OF DAILY LIVING (ADL)
ADL_ASSISTANCE: INDEPENDENT
ADLS_ADDRESSED: NO
LACK_OF_TRANSPORTATION: NO

## 2023-12-15 NOTE — ANESTHESIA PROCEDURE NOTES
Peripheral Block    Patient location during procedure: pre-op  Start time: 12/15/2023 10:42 AM  End time: 12/15/2023 10:43 AM  Reason for block: at surgeon's request and post-op pain management  Staffing  Performed: attending   Authorized by: Kervin Stephen MD    Performed by: Kervin Stephen MD  Preanesthetic Checklist  Completed: patient identified, IV checked, site marked, risks and benefits discussed, surgical consent, monitors and equipment checked, pre-op evaluation and timeout performed   Timeout performed at: 12/15/2023 10:36 AM  Peripheral Block  Patient position: laying flat  Prep: ChloraPrep  Patient monitoring: heart rate, cardiac monitor and continuous pulse ox  Block type: adductor canal  Laterality: left  Injection technique: single-shot  Guidance: ultrasound guided  Needle  Needle type: Rosie   Needle gauge: 22 G  Needle length: 10 cm  Needle localization: ultrasound guidance     image stored in chart  Test dose: negative  Assessment  Injection assessment: negative aspiration for heme, no paresthesia on injection, incremental injection and local visualized surrounding nerve on ultrasound  Paresthesia pain: none  Heart rate change: no  Slow fractionated injection: yes  Additional Notes  USED MARCAINE 0.5 % TOTAL 10ML, ROPIVICAINE 0.5% TOTAL 10ml and DEXAMETHASONE 10MG INJECTED IN DIVIDED DOSES WITHOUT PROBLEMS

## 2023-12-15 NOTE — ANESTHESIA PREPROCEDURE EVALUATION
Patient: Megha Vivas    Procedure Information       Date/Time: 12/15/23 1210    Procedure: Arthroplasty Total Knee (NICKEL ALLERGY. Beto Persona nickel free knee) (Left: Knee)    Location: [facility] / [facility]    Surgeons: Ernie Simon MD          Past Medical History:   Diagnosis Date    Cataract     Hard to intubate     Hypertension     Vertigo      Past Surgical History:   Procedure Laterality Date    CATARACT EXTRACTION Left     HYSTERECTOMY      OTHER SURGICAL HISTORY      left victrectomy    OTHER SURGICAL HISTORY      bilateral breast reduction         Relevant Problems   Anesthesia (within normal limits)      Musculoskeletal   (+) Primary osteoarthritis of left knee      Other   (+) Arthritis of knee, left       Clinical information reviewed:   Tobacco  Allergies  Meds   Med Hx  Surg Hx  OB Status  Fam Hx  Soc   Hx        NPO Detail:  NPO/Void Status  Date of Last Liquid: 12/14/23  Time of Last Liquid: 2300  Date of Last Solid: 12/14/23  Time of Last Solid: 2300  Last Intake Type: Light meal  Time of Last Void: 0920         Physical Exam    Airway  Mallampati: I  TM distance: >3 FB  Neck ROM: full     Cardiovascular - normal exam     Dental - normal exam     Pulmonary - normal exam     Abdominal            Anesthesia Plan    ASA 3     spinal   (PREOP BLK)  The patient is not a current smoker.    Anesthetic plan and risks discussed with patient.    Plan discussed with CRNA and CAA.

## 2023-12-15 NOTE — PERIOPERATIVE NURSING NOTE
Discharge instructions given to patient and  to look over. Pt. Is comfortable. Pain is tolerable.

## 2023-12-15 NOTE — PROGRESS NOTES
Medication Education     Medication education for Megha Vivas was provided to the patient and family for the following medication(s):  Oxycodone  Tramadol  Tylenol  Pantoprazole  Zofran  Aspirin  Cefadroxil  Senna  Docusate    Medication education provided by a Pharmacist:  -Proper dose, indication, possible ADRs   -How the medication works and benefits of taking it  -Importance of compliance   -Potential duration of therapy    Identified potential barriers to education:  None    Method(s) of Education:  Verbal Written materials provided and reviewed    An opportunity to ask questions and receive answers was provided.     Assessment of understanding the patient and family:  2= meets goals/outcomes    Additional Notes (if applicable): Meds to beds given to patient.    Erica Rendon, Rosa IselaD

## 2023-12-15 NOTE — PERIOPERATIVE NURSING NOTE
Patient given discharge instructions and patient and  have no questions. Pt. PIV dc'd. Pt. Accompanied to car via wheelchair.

## 2023-12-15 NOTE — PERIOPERATIVE NURSING NOTE
Patient eating and tolerating food with no c/o nausea. Pt. Treated for 5-6/10 L knee pain w/ 5mg oxycodone tablet.

## 2023-12-15 NOTE — POST-PROCEDURE NOTE
Shivering has completely subsided.  Patient denies any pain or discomfort.  Pedal pulse on left foot 2=, cap refill brisk, patient has sensation and able to move toes.  Vitals stable.

## 2023-12-15 NOTE — PERIOPERATIVE NURSING NOTE
Patient admitted to phase II. Pt. Oriented to room and instructed to order food.  at bedside. Pt. BLE motor strengths strong. Pain is tolerable.

## 2023-12-15 NOTE — ANESTHESIA PROCEDURE NOTES
Spinal Block    Patient location during procedure: OR  Start time: 12/15/2023 11:18 AM  End time: 12/15/2023 11:25 AM  Reason for block: primary anesthetic and at surgeon's request  Staffing  Performed: AARON   Authorized by: Kervin Stephen MD    Performed by: AARON Newsome    Preanesthetic Checklist  Completed: patient identified, IV checked, site marked, risks and benefits discussed, surgical consent, monitors and equipment checked, pre-op evaluation, timeout performed and sterile techniques followed  Block Timeout  RN/Licensed healthcare professional reads aloud to the Anesthesia provider and entire team: Patient identity, procedure with side and site, patient position, and as applicable the availability of implants/special equipment/special requirements.  Patient on coagulant treatment: no  Timeout performed at: 12/15/2023 11:17 AM  Spinal Block  Patient position: sitting  Prep: Betadine  Sterility prep: cap, drape, gloves, hand hygiene and mask  Sedation level: light sedation  Patient monitoring: heart rate, continuous pulse oximetry and blood pressure  Approach: midline  Vertebral space: L3-4  Injection technique: single-shot  Needle  Needle type: pencil-point   Needle gauge: 25 G  Needle length: 3.5 in (3.5 inches)  Free flowing CSF: yes    Assessment  Sensory level: T6  Block outcome: patient comfortable  Procedure assessment: patient sedated but conversant throughout procedure  Additional Notes  Patient tolerated procedure well with no immediate complications.      Medication dosage:    1.4 ml of 0.75 % Bupivacaine with 8.25% Dextrose.    Lot #:  [id number]  Expiration date:  01/31/21026

## 2023-12-15 NOTE — PROGRESS NOTES
Physical Therapy    Physical Therapy Evaluation & Treatment    Patient Name: Megha Vivas  MRN: [id number]  Today's Date: 12/15/2023   Time Calculation  Start Time: 1612  Stop Time: 1721  Time Calculation (min): 69 min    Assessment/Plan   PT Assessment  PT Assessment Results: Decreased strength, Decreased range of motion, Decreased endurance, Decreased mobility, Pain  Rehab Prognosis: Excellent  Evaluation/Treatment Tolerance: Patient tolerated treatment well  Strengths: Ability to acquire knowledge, Attitude of self, Capable of completing ADLs semi/independent, Insight into problems, Premorbid level of function, Support of Caregivers, Support and attitude of living partners  End of Session Communication: Bedside nurse  Assessment Comment: Patient presenting with decreased functional mobility following L TKA performed on 12/15. Education regarding TKA precautions provided prior to mobility; patient verbalized and demonstrated understanding. Therapeutic exercises performed in supine; HEP provided. Patient performed well during session able to self correct L knee mild buckle x2 following initial cueing from PT. Voided 100mL during session; RN notified. PT recommends DC home with assist as needed and follow up to OP PT.  End of Session Patient Position: Up in chair with LLE elevated + extended and ice to surgical site. Call light left in reach; patient instructed not to get up on own. RN notified.     IP OR SWING BED PT PLAN  Inpatient or Swing Bed: Inpatient  PT Plan  Treatment/Interventions: Bed mobility, Transfer training, Gait training, Stair training, Neuromuscular re-education, Strengthening, Endurance training, Range of motion, Therapeutic exercise, Therapeutic activity, Home exercise program  PT Plan: Skilled PT  PT Frequency: BID  PT Discharge Recommendations: Low intensity level of continued care  Equipment Recommended upon Discharge: Wheeled walker, Straight cane  PT Recommended Transfer Status: Assistive  device, Stand by assist  PT - OK to Discharge: Yes      Subjective     General Visit Information:  General  Reason for Referral: L TKA (12/15)  Referred By: Dr. Simon  Past Medical History Relevant to Rehab: HTN, vertigo, cataract, hysterectomy  Family/Caregiver Present: Yes ( Gustavo)  Prior to Session Communication: Bedside nurse  Patient Position Received: Bed, 1 rail up  General Comment: Patient very pleasant and agreeable to PT evaluation; session cleared by RN.  present throughout session.  Home Living:  Home Living  Type of Home: House  Lives With: Spouse, Adult children  Home Adaptive Equipment: Walker rolling or standard, Cane  Home Layout: Two level, Bed/bath upstairs  Home Access: Stairs to enter without rails  Entrance Stairs-Number of Steps: 5  Bathroom Shower/Tub: Tub/shower unit  Bathroom Equipment: None  Home Living Comments: Patient reports having 7+7 stairs with no rails to bedroom and bathroom.  Prior Level of Function:  Prior Function Per Pt/Caregiver Report  Level of [place]: Independent with ADLs and functional transfers, Independent with homemaking with ambulation  ADL Assistance: Independent  Homemaking Assistance: Independent  Ambulatory Assistance: Independent  Vocational: Retired  Leisure: line dancing  Prior Function Comments: patient denies falls prior to admission  Precautions:  Precautions  LE Weight Bearing Status: Weight Bearing as Tolerated  Post-Surgical Precautions: Left total knee precautions    Objective   Pain:  Pain Assessment  Pain Assessment: 0-10  Pain Score: 6  Pain Type: Surgical pain  Pain Interventions: Cold applied  Cognition:  Cognition  Overall Cognitive Status: Within Functional Limits  Attention: Within Functional Limits  Memory: Within Funtional Limits  Problem Solving: Within Functional Limits  Safety/Judgement: Within Functional Limits    General Assessments:  Activity Tolerance  Endurance: Tolerates 30 min exercise with multiple  rests  Activity Tolerance Comments: Patient did required 2 seated rest breaks during activity due to reports of fatigue.    Sensation  Light Touch: No apparent deficits  Proprioception: No apparent deficits      Coordination  Movements are Fluid and Coordinated: Yes    Postural Control  Postural Control: Within Functional Limits    Static Sitting Balance  Static Sitting-Balance Support: Feet supported  Static Sitting-Level of Assistance: Independent    Static Standing Balance  Static Standing-Balance Support: Bilateral upper extremity supported  Static Standing-Level of Assistance: Close supervision  Dynamic Standing Balance  Dynamic Standing-Balance Support: Bilateral upper extremity supported  Dynamic Standing-Comments: close supervision with rolling walker  Functional Assessments:  ADL  ADL's Addressed: No    Bed Mobility  Bed Mobility: Yes  Bed Mobility 1  Bed Mobility 1: Supine to sitting  Level of Assistance 1: Distant supervision    Transfers  Transfer: Yes  Transfer 1  Transfer From 1: Bed to, Stand to  Transfer to 1: Stand, Toilet, Chair with arms  Technique 1: Sit to stand, Stand to sit  Transfer Device 1: Walker  Transfer Level of Assistance 1: Close supervision, Minimal verbal cues (verbal cues to ensure controlled descent into chair; patient demonstrated poor eccentric control of glutes when transferring from stand to sit)  Trials/Comments 1: x6    Ambulation/Gait Training  Ambulation/Gait Training Performed: Yes  Ambulation/Gait Training 1  Surface 1: Level tile  Device 1: Rolling walker  Gait Support Devices: Wheelchair follow  Assistance 1: Close supervision  Quality of Gait 1: Diminished heel strike, Inconsistent stride length, Decreased step length, Antalgic, Soft knee(s), Knee(s) buckle (decreased jona; step to pattern able to transition into reciprocal pattern; mild knee buckle x2 during first ambulation bout however patient able to self correct following inital instruction from  "PT)  Comments/Distance (ft) 1: 15'; 100'; 50'; 150'    Stairs  Stairs: Yes  Stairs  Rails 1: None (Comment) (R straight cane and L HHA)  Device 1: Single point cane  Assistance 1: Close supervision, Hand held assistance (patient demonstrated understanding of sequencing for surgical limb + AD; able to keep L quad engaged to prevent L knee buckling during stair negotiation)  Comment/Number of Steps 1: six 6\" stairs; patient's  present for stair negotiation; provided with instruction and correct guarding technique for assisting patient during ascending/descending stairs; verbalized understanding.  Extremity/Trunk Assessments:  RUE   RUE : Within Functional Limits  LUE   LUE: Within Functional Limits  RLE   RLE : Within Functional Limits  LLE   LLE : Exceptions to WFL  AROM LLE (degrees)  LLE AROM Comment: >90 degrees L knee flexion in supine; full knee extension in supine  Strength LLE  LLE Overall Strength: Greater than or equal to 3/5 as evidenced by functional mobility (poor eccentric glute strength during stand>sit transfers)  Treatments:  Therapeutic Exercise  Therapeutic Exercise Performed: Yes B ankle pumps, L quad sets, L gluteal sets, L heel slides, L SAQ, L hip abduction, and L SLR x 10 reps each.    Outcome Measures:  [facility] Basic Mobility  Turning from your back to your side while in a flat bed without using bedrails: None  Moving from lying on your back to sitting on the side of a flat bed without using bedrails: None  Moving to and from bed to chair (including a wheelchair): A little  Standing up from a chair using your arms (e.g. wheelchair or bedside chair): A little  To walk in hospital room: A little  Climbing 3-5 steps with railing: A little  Basic Mobility - Total Score: 20    Encounter Problems       Encounter Problems (Active)       Balance       LTG - Patient will maintain standing and sitting balance to allow for completion of daily activities (Progressing)       Start:  12/15/23          "      Compromised Skin Integrity       LTG - Patient will be free from infection (Progressing)       Start:  12/15/23               Mobility       LTG - Patient will ambulate community distance (Progressing)       Start:  12/15/23               Pain          Safety       LTG - Patient will demonstrate safety requirements appropriate to situation/environment (Progressing)       Start:  12/15/23               Transfers       LTG - Patient will transfer from one surface to another (Progressing)       Start:  12/15/23            LTG - Patient will demonstrate safe transfer techniques (Progressing)       Start:  12/15/23                   Education Documentation  Handouts, taught by Jane Villalobos PT at 12/15/2023  6:01 PM.  Learner: Significant Other, Patient  Readiness: Eager  Method: Explanation, Demonstration, Handout  Response: Verbalizes Understanding, Demonstrated Understanding    Precautions, taught by Jane Villalobos PT at 12/15/2023  6:01 PM.  Learner: Significant Other, Patient  Readiness: Eager  Method: Explanation, Demonstration, Handout  Response: Verbalizes Understanding, Demonstrated Understanding    Body Mechanics, taught by Jane Villalobos PT at 12/15/2023  6:01 PM.  Learner: Significant Other, Patient  Readiness: Eager  Method: Explanation, Demonstration, Handout  Response: Verbalizes Understanding, Demonstrated Understanding    Home Exercise Program, taught by Jane Villalobos PT at 12/15/2023  6:01 PM.  Learner: Significant Other, Patient  Readiness: Eager  Method: Explanation, Demonstration, Handout  Response: Verbalizes Understanding, Demonstrated Understanding    Mobility Training, taught by Jane Villalobos PT at 12/15/2023  6:01 PM.  Learner: Significant Other, Patient  Readiness: Eager  Method: Explanation, Demonstration, Handout  Response: Verbalizes Understanding, Demonstrated Understanding    Education Comments  No comments found.      Jane Villalobos PT, DPT

## 2023-12-15 NOTE — POST-PROCEDURE NOTE
Patient able to move toes and feels touch to left lower extremity, still continues to deny any pain.

## 2023-12-15 NOTE — ANESTHESIA PROCEDURE NOTES
Peripheral Block    Patient location during procedure: pre-op  Start time: 12/15/2023 10:45 AM  End time: 12/15/2023 10:46 AM  Reason for block: at surgeon's request and post-op pain management  Staffing  Performed: attending   Authorized by: Kervin Stephen MD    Performed by: Kervin Stephen MD  Preanesthetic Checklist  Completed: patient identified, IV checked, site marked, risks and benefits discussed, surgical consent, monitors and equipment checked, pre-op evaluation and timeout performed   Timeout performed at: 12/15/2023 10:36 AM  Peripheral Block  Patient position: laying flat  Prep: ChloraPrep  Patient monitoring: heart rate, cardiac monitor and continuous pulse ox  Block type: other (IPACK)  Laterality: left  Injection technique: single-shot  Guidance: ultrasound guided  Needle  Needle type: Rosie   Needle gauge: 22 G  Needle length: 10 cm  Needle localization: ultrasound guidance     image stored in chart  Test dose: negative  Assessment  Injection assessment: negative aspiration for heme, no paresthesia on injection, incremental injection and local visualized surrounding nerve on ultrasound  Paresthesia pain: none  Heart rate change: no  Slow fractionated injection: yes  Additional Notes  MARCAINE 0.5% TOTAL 10 ML INJECTED IN DIVIDED DOSES WITHOUT PROBLEMS

## 2023-12-15 NOTE — POST-PROCEDURE NOTE
Patient arrived to PACU, alert and awake on 9 liters Supplemental Mask, saturation >97%.  Patient denies any pain or discomfort.  Ace Wrap with immobolizer on left knee, dressing dry and intact.  Patient unable to move toes or left extremity.  Patient denies any pain or discomfort.  Left pedal pulse 2+, cap refill brisk, toes pink and warm.      1408 Patient wide awake, placed patient on room air.  Patient shivering buut denies any pain.

## 2023-12-15 NOTE — ANESTHESIA POSTPROCEDURE EVALUATION
Patient: Megha Vivas    Procedure Summary       Date: 12/15/23 Room / Location: [facility] 04 / [facility]    Anesthesia Start: 1109 Anesthesia Stop: 1405    Procedure: Arthroplasty Total Knee (Left: Knee) Diagnosis:       Primary osteoarthritis of left knee      (Primary osteoarthritis of left knee [M17.12])    Surgeons: Ernie Simon MD Responsible Provider: Kervin Stephen MD    Anesthesia Type: spinal ASA Status: 3            Anesthesia Type: spinal    Vitals Value Taken Time   /74 12/15/23 1400   Temp 36.1 °C (97 °F) 12/15/23 1400   Pulse 88 12/15/23 1400   Resp 13 12/15/23 1400   SpO2 100 % 12/15/23 1400       Anesthesia Post Evaluation    Patient location during evaluation: bedside  Patient participation: complete - patient participated  Level of consciousness: responsive to verbal stimuli  Pain score: 0  Pain management: adequate  Multimodal analgesia pain management approach  Airway patency: patent  Cardiovascular status: acceptable  Respiratory status: acceptable  Hydration status: acceptable  Postoperative Nausea and Vomiting: none  Comments: PT HEMODYNAMICALLY STABLE, NO PONV, AWAKE, ALERT AND ORIENTATED TIMES THREE        There were no known notable events for this encounter.

## 2023-12-15 NOTE — OP NOTE
Arthroplasty Total Knee (L) Operative Note     Date: 12/15/2023  OR Location: [facility]    Name: Megha Vivas, : 1950, Age: 73 y.o., MRN: [id number], Sex: female    Diagnosis  Pre-op Diagnosis     * Primary osteoarthritis of left knee [M17.12] Post-op Diagnosis     * Primary osteoarthritis of left knee [M17.12]     Procedures  Arthroplasty Total Knee  49564 - AR ARTHRP KNE CONDYLE&PLATU MEDIAL&LAT COMPARTMENTS      Surgeons      * Ernie Simon - Primary    Resident/Fellow/Other Assistant:  Surgeon(s) and Role:    Procedure Summary  Anesthesia: Spinal  ASA: III  Anesthesia Staff: Anesthesiologist: Kervin Stephen MD  C-AA: AARON Newsome  Estimated Blood Loss: 25mL  Intra-op Medications:   Medication Name Total Dose   ropivacaine-epinephrine-clonidine-ketorolac 2.46-0.005- 0.0008-0.3mg/mL periarticular syringe 50 mL   lactated Ringer's infusion Cannot be calculated   meperidine PF (Demerol) injection 12.5 mg 25 mg              Anesthesia Record               Intraprocedure I/O Totals          Intake    lactated Ringer's infusion 1300.00 mL    ceFAZolin in dextrose (iso-os) (Ancef) IVPB 2 g 100.00 mL    Total Intake 1400 mL       Output    Urine 0 mL    Est. Blood Loss 25 mL    Total Output 25 mL       Net    Net Volume 1375 mL          Specimen: No specimens collected     Staff:   Circulator: Zoe Lundberg RN  Relief Circulator: Steph Evans RN  Scrub Person: Carey Rodriguez; Enedina Irby; Alee Torres         Drains and/or Catheters: * None in log *    Tourniquet Times:     Total Tourniquet Time Documented:  Leg (Left) - 121 minutes  Total: Leg (Left) - 121 minutes      Implants:  Implants       Type Name Action Serial No.      Joint CEMENT, BONE SIMPLEX P W/TOBRA - [id number] Implanted      Joint CEMENT, BONE SIMPLEX P W/TOBRA - [id number] Implanted      Screw DRILL PIN, TROCAR, HEADLESS - [id number] Implanted      Screw DRILL PIN, TROCAR, HEADLESS - [id number] Implanted      Screw DRILL PIN,  TROCAR, HEADLESS - [id number] Implanted      Screw DRILL PIN, TROCAR, HEADLESS - [id number] Implanted      Joint SCREW, FEMALE 25MM, PSN 2.5MM - [id number] Implanted       SIZE 10, PERSONA CR PERSONALIZED KNEE FEMUR, CEMENTED, TI-NIDIUM TIVANIUM NITRATED, STANDARD, LEFT Implanted      Joint STEM, TIBIAL, PERSONA, 5DEG, SZ E L - [id number] Implanted       35MM PERSONA AII-POLY PATELLA, VE            Implanted               Findings: DJD Left knee     Implants:   Beto Persona Femur Size 10, Nickel Free   Beto Persona Tibia Size E  Beto 35 mm Patella   Beto Vit E MC Polyethylene 10 mm       Anesthesia: Spinal      Tourniquet Time: 120 min       Medications: Ancef and TXA       Position: Supine       Blood loss: 25 cc       Complications: None       Specimen: None       INDICATIONS:   The patient is a 73 y.o. year old female with a longstanding history of knee pain secondary to end stage osteoarthritis. The patient failed non-operative treatment to include anti-inflammatories, rest, and activity modification. Radiographs showed bone-on-bone osteoarthritis.  After discussing the options, risks, benefits, and possible complications, the patient elected to undergo a total knee arthroplasty. The patient understood the risks including but are not limited to infection, fracture, loosening of the components, failure of the implants, loss of range of motion, stiffness, chronic pain, opioid addiction, disability, wear, loosening, reaction to implanted materials, DVT, PE, MI, stroke, loss of limb, loss of life, or potential need for further surgery. The patient understands these risks and has elected to proceed.       TECHNIQUE:   The patient was properly identified in the holding area using name, medical record number, and date of birth. Informed consent was then signed and the operative extremity was marked. The patient had an opportunity to ask questions which were answered. The procedure, risks, benefits and alternatives  were reviewed. Next, the patient was brought back to the operating room. Spinal anesthesia was initiated without difficulty. The patient was placed in the supine position on the table. All breezy prominences were well padded. A sequential compression device was applied to the contralateral extremity. The operative lower extremity was prepped and draped in usual sterile fashion. The patient received ancef and TXA prior to incision.   A preprocedural timeout was then performed once again confirming the patient's identity, procedure, and laterality. In addition, allergy status and required equipment were reviewed. Three independent members of the operating room team agreed on the above-mentioned parameters. The pre-operative marking was still visible.       The leg was then exsanguinated, tourniquet inflated, incision made in the midline.  Soft tissue dissected down to the retinaculum was performed. A medial parapatellar arthrotomy was performed.  Partial medial synovectomy was performed. A medial release was performed.  Fat pad was resected laterally. The anterior medial and lateral meniscus were released. ACL was transected.       Using the step drill, the femoral canal was opened.  We then used bulb irrigation to irrigate the intramedullary canal.  The intramedullary ce was introduced into the femur.  The distal femoral cut angle was set at 5 degrees. Distal resection block was pinned in place.  Collateral retractors were placed and the distal femur was resected.             We then turned our attention to the proximal tibia. Medial, lateral and posterior retractors were placed.  A 3 degree slope was selected but I adjusted it to increase the slope to approximately 5 to 7 degrees per the technique guide.  The block was pinned in place and the proximal tibia was resected.  The cut was verified to ensure that it was flat and not in varus or valgus.       The knee was brought into extension and a medial and lateral  meniscectomy were performed.       We then turned our attention back to the femur.  The sizing guide was introduced and pinned in place.  The femur was sized to a size 11.  Appropriate pins were placed.  The sizing block was then removed and the 4-in-1 cutting block was introduced and pinned in place.  The anterior, posterior and chamfer cuts were then made.  The cutting block was removed.       Attention was then turned to the posterior osteophytes. Using a curved 3/4 inch osteotome, the osteophytes were removed. We then used a cob to elevate the posterior capsule from the femur.       The tibia was subluxed and a trial base plate was used to size the tibia. A size E was appropriate. This was secured in place with provisional pins. The femoral trail was then impacted. A trial polyethylene was inserted. The PCL was completely released.  Unfortunately, the knee was still tight to flexion.  Accordingly, we shifted the femoral block anteriorly and downsized to a size 10 in order to increase the flexion space by approximately 2 mm.  The block was repaired and in the appropriate cuts made.  Trials were then replaced.  The flexion space was improved.    The knee was able to be flexed to 120 degrees. The knee was stable to both varus and valgus stress through full range of motion, including mid flexion. The knee was checked again and found to be symmetric. The patella tracked well. The trial polyethylene was removed, and the femoral lug holes were drilled. The final tibial preparation was performed.    Attention was then turned to the patella.  The patella measured 23 mm centrally. It was then cut to 14 mm. The patella was checked to ensure it was symmetric. The patella was then sized and prepared using the guide. A size 35 mm was found to be appropriate.  I placed a patella protector and flexed up the knee.     All trials were removed. The knee was copiously irrigated and dried.       The cement was mixed and the final  implants were cemented into place starting with the tibia. Excess cement was removed. A trial polyethylene was placed and the knee was held in full extension to allow the cement to harden.     Finally, the trial polyethylene was replaced with the definitive insert. A 10 mm thickness was appropriate. The knee was again brought through a full range of motion. It was stable to both varus and valgus stress through a full range of motion. The patella tracked centrally. The joint mix was injected into the local soft tissues and the wound was irrigated with copious pulse lavage.       The knee was again copiously irrigated. The retinaculum was approximated with #2 ethibond and then closed with #1 Stratafix. Deep subcutaneous and dermis closed with 0 and 2-0 Vicryl in simple interrupted fashion. Skin closure with Monocryl and skin glue.       The patient tolerated the procedure well with no complications and was taken from the operating room in stable condition. All sponge and needle counts were correct at the end of the case.       I attest that I was present and scrubbed for all critical parts of the procedure.     Post Operative Plan:  Weight bearing as tolerated  Aspirin 81 mg BID for 4 weeks for VTE prophylaxis  Post operative antibiotics in PACU  Disposition: Rapid Recovery    Complications:  None; patient tolerated the procedure well.    Disposition: PACU - hemodynamically stable.  Condition: stable         Additional Details: NA    Attending Attestation: I was present and scrubbed for the entire procedure.    Ernie Simon  Phone Number: [phone]

## 2023-12-15 NOTE — PROGRESS NOTES
Pt is POD #0 knee.   ADOD:  today  RN TCC spoke with pt.  Introduction of self and explanation of dc planning role provided and she agreed to proceed.   Pt plans on OP therapy and will schedule appt. She verbalized understanding of her post-op recovery plan.

## 2023-12-15 NOTE — DISCHARGE INSTRUCTIONS
Ernie Simon MD MS  [address] [facility]   [address]  [phone] (office)  [phone] (fax)    PLEASE READ CAREFULLY BEFORE CONTACTING YOUR PROVIDER.    WE WORK COLLABORATIVELY AS A TEAM. CALLING MULTIPLE STAFF MEMBERS REGARDING THE SAME ISSUE WILL DELAY YOUR CARE.  Side[employer]Cr[employer] IS THE PREFERRED COMMUNICATION FOR ALL TEAM MEMBERS.    Postoperative Instructions: TOTAL KNEE ARTHROPLASTY    JOINT CARE TEAM  Please use the information below to contact your care team following surgery.  If you are leaving a message, please include your full name, date of birth and date of surgery so that we can correctly identify you.  Your call will be returned within 1-2 business days, please do not leave multiple messages regarding a single issue while you are awaiting a return call.     Who to call Contact Information Matters needing handled   Ernie Simon MD [facility] Portal  [phone] Prescription Refills   Orders for dental antibiotics lifelong     Polina Diallo MBA, BSN, RN-BC  Ortho Coordinator Mary Finn, RN, BSN  Ortho Coordinator Cecilio George BSN-BC  Ortho Nurse Navigator   [phone] Nursing, medical question related questions or concerns within 6 weeks of surgery   Orders for Outpatient Physical Therapy                    [phone]     Scheduling office Visits  Medical questions/concerns  Leave of Absence or other paperwork  Any concerns more than 6 weeks from surgery - an appointment will need to be made     MEDICATION REFILLS - (MyChart or Main Office)    You will not receive a call indicating that your prescription has been filled.  Please contact your pharmacy with any questions.    Medication refills will be filled Monday-Friday 7am to 1pm ONLY. Please call the office or send a [facility] 911 message for a refill request.  Any requests received outside of this timeframe will be handled on the next business day.  The office staff and  orthopedic nurses cannot refill medications; messages should be left directly through the office or via my chart.  Please do not call multiple times or call other members of the care team for medication needs, this will cause the refill to take longer.    Per State and Institutional policy, pain medications can only be refilled every 7 days for up to six weeks following surgery.    DRIVING & TRAVEL AFTER SURGERY   Patients should anticipate waiting at least 4-6 weeks before traveling long distances after surgery.  You will need to stop to walk around ever 1 hour during your travel to help with blood clot prevention.  Please call the office or your joint nurse to discuss prior to post-surgical travel.  Patients may not drive until cleared by the joint nurse or the office.    DENTAL PROCEDURES & CLEANINGS  You must wait a minimum of 3 months for elective dental appointments, including routine cleanings or dental work including bridges, crowns, extractions, etc..  For any dental visit - cleaning or dental procedures - patients must take an antibiotic 1 hour before the appointment.  Antibiotics are a lifelong need before dental appointments.  The antibiotic prescribed will be based on each patient's allergies.    WOUND CARE  Pain and swelling are normal following surgery and can last for weeks to months depending on the patient.  To help relieve these symptoms, please follow the post-operative pain regimen as it has been prescribed, use ice often, wear compression stockings every day as prescribed, and elevate your leg every hour.   Leaving the hospital, you have an ACE wrap in place. Two days after surgery, you can remove the ACE wrap and discard it. It does NOT need to be reapplied again.  You have a waterproof bandage on your wound and may shower with this on. The waterproof bandage is to remain in place for a 7 days. You or your home therapist should remove it at this time. You may leave your incision open to air  after the bandage has been removed.  You may shower 48 hours after surgery.  Do not scrub directly over or near the edges of your surgical bandage.  Soap and water may run freely over the site.  Under your waterproof bandage you have Steri-strips or a mesh covering in place. DO NOT peel them off. They will fall off on their own. You can continue to shower with these on. Let the water run freely over your incision when showering and do not scrub the incision or the surrounding area.   When drying the area around the incision, please use a SEPARATE towel that was not used on the rest of your body. The towel should be recently laundered but does not have to be cleaned a specific way. It should be laundered every 3 days. Pat the area dry. Do not rub the area around the incision. Steri strips will fall off in 1-2 weeks. If after 2 weeks they, have not, gently peel them off.  You may have clear stitches at the ends of your wound. Place Band-Aids on them for the first few weeks to prevent rubbing. You can gently tug on them after 2 weeks and they will come out or fall out on their own. DO NOT cut them. If they have not fallen out by you post-op visit, your doctor will remove them  If you have black stitches in place, your doctor will remove them in 2 weeks. These CANNOT get wet. If you have the silver waterproof bandage in place, you can shower. If the waterproof bandage is removed or not sticking, you CANNOT shower.  DO NOT soak your incision in a bath, hot tub, pool or pond/lake for a minimum of 12 weeks following your surgery.  DO NOT use lotions, creams, ointments on your wound for a minimum of 6 weeks following your surgery. At that time you may use vitamin E to assist with softening of your incision. Your physical therapist or doctor will talk to you about scar massage which can be started at 6 weeks.   You do NOT need to use the soap that was provided to you prior to surgery after surgery. Use regular soap or  shampoo.     PAIN, SWELLING, BRUISING & CLICKING  Pain and swelling are a natural part of your recovery which is considered normal fur up to a year after surgery.  Symptoms may be treated with movement, ice, compression stockings, elevating your leg, and by following the pain medication regimen as prescribed.  Bruising is normal for several weeks after surgery and will run down the leg over time.  You may ice areas that are tender to help with discomfort.  You are required to wear the provided compression stockings, every day, for 4 weeks following surgery.  Remove the stockings at night and place them back on in the morning.  Pain and swelling may temporarily increase with an increase in activity or exercise.  Use ice after activity.  Audible clicking with movement or exercises is considered normal following joint replacement.  You may also feel decreased sensation or numbness near the incision site.  These are usually normal and may or may not fade over time.     PERSONAL HYGIENE  You may shower upon discharging from the hospital.  Soap and water is permitted to run over the surgical dressing, steri-strips and incision.  Do not scrub directly over these items.  DO NOT soak your incision in a bath, hot tub, pool or pond/lake for a minimum of 12 weeks following your surgery.  DO NOT use lotions, creams, ointments on your wound for a minimum of 6 weeks following your surgery. At that time you may use vitamin E to assist with softening of your incision.      RESTARTING HOME ROUTINE - DIET & MEDICATIONS  Post-operative constipation can result due to a combination of inactivity, anesthesia and pain medication. To help prevent this, you should increase your water and fiber intake. Physical activity such as walking will also help stimulate the bowels.   You may resume your normal diet when you discharge home.  Choose foods that help promote good bowel habits and prevent constipation, such as foods high in fiber.  You may  restart your home medications the following day after your surgery UNLESS you have been given alternate instructions.  Follow the instructions given to you on your hospital discharge instructions for more information regarding your home medications.      IN-HOME PHYSICAL THERAPY & OUTPATIENT PHYSICAL THERAPY  Remember to get up and walk around your home every hour to 90 minutes to prevent blood clots and help with your recovery. This is an ACTIVE recovery.  In-home physical therapy will start 1-2 days after you get home from the hospital.    The home care agency will call within the first 24-48 hours to set up their first visit.  Please do not call your care team to inquire during this timeframe.  Continue the exercises you were given in the hospital until you have been seen by in-home therapy.  Make sure to provide a phone number with the ability for the home care staff to leave a message if you do not answer your phone.    Outpatient physical therapy following knee replacement surgery should begin 2-3 weeks after surgery if you and your physical therapist feel that you need it.  You should call to schedule this appointment ASAP if not already scheduled before surgery.  Waiting until you are ready for outpatient physical therapy will cause a delay in your care.  You may choose any outpatient physical therapy location.  Call the office for an order if needed.  Range of motion: Your physical therapist will work with you on regaining your range of motion after surgery. By 4 weeks after surgery, you should be able to bend to at least 100 degrees if not more.     NEVER place a pillow or blanket under your knee. When you are in bed or sitting on a couch, keep your knee COMPLETELY STRAIGHT. This is best done by placing a pillow or blanket under your calf or heel to lift your knee off of the mattress or couch.     EMERGENCIES - WHEN TO CONTACT THE SURGEON'S OFFICE IMMEDIATELY  Fever >101 with chills that has been present  for at least 48 hours.   Excessive bleeding from incision that will not slow down. A small amount of drainage is normal and expected.  Once pressure is applied and the area is covered, do not continue to check the area regularly.  This will remove pressure and bleeding will continue.  Leave in place for 4-6 hours.  Signs of infection of incision-excessive drainage that is soaking through your dressing (especially if it is pus-like), redness that is spreading out from the edges of your incision, or increased warmth around the area.  Excruciating pain for which the pain medication, taken as instructed, is not helping.  Severe calf pain.  Go directly to the emergency room or call 911, if you are experiencing chest pain or difficulty breathing.    ICE & COLD THERAPY INSTRUCTIONS    To assist with pain control and post-op swelling, you should be using ice regularly throughout recovery, especially for the first 6 weeks, regardless of the cold therapy method you use.      Always make sure there is a layer of protection between the cold pad and your skin.    If you are using ICE PACKS or GEL PACKS, you will need to alternate 20 minutes on, 20 minutes off twice per hour.    If you are using an ICE MACHINE, please follow the provided ice machine instructions.  These devices differ from ice or ice packs whereas the mechanism circulates water through tubing and a pad to provide longer periods of cold therapy to the desired site.  You can use your cold devices around the clock for optimal comfort.  We recommend using cold therapy after working with therapy or completing exercises on your own.  There is no set schedule in which you must follow while using cold therapy.  Below are a few points to remember when using a cold therapy device:    You do not need to need to use the 20 on, 20 off method.  Detach the pad from the cooler and ambulate at least once every hour.  You can check your skin under the pad at this time.  You may  wear the cold therapy device during periods of sleep including overnight.  If you wake up during the night, you can check the skin at this time.  You do not need to wake up specifically to perform skin checks.  Empty the cooler and pad when device is not in use.  Follow 's instructions for cleaning your cold therapy device.      DISCHARGE MEDICATIONS - Please reference the sample schedule for instructions on how to best schedule medications.  PAIN MEDICATION    ___X_ Tramadol / Oxycodone  Tramadol and Oxycodone have been prescribed for post-operative pain control.    These medications will only be refilled ONCE every 7 days for a period of up to 6 weeks following surgery.  After 6 weeks, you will transition to acetaminophen and over -the- counter anti-inflammatories such as Ibuprofen, Advil or Aleve in conjunction with ICE/COLD THERAPY.   Side effects may be constipation and nausea, vomiting, sleepiness, dizziness, lightheadedness, headache, blurred vision, dry mouth sweating, itching (if you have itching, over-the -counter Benadryl can be used as needed).  You may NOT operate a motor vehicle while taking these medications or have been cleared by your care team.     ___X_ Acetaminophen (Tylenol)  Acetaminophen has been prescribed as an adjunct for pain control. Take two 500 mg tablets every 6 hours for 4 weeks. You will not receive a refill on this medication.  Do not exceed 4000mg of acetaminophen within a 24 hour period.  Side effects may include nausea, heartburn, drowsiness, and headache.    _______ Meloxicam (Mobic)-Meloxicam has been prescribed as an adjunct anti-inflammatory to assist in pain control.    Take one 15mg tablet once daily for 4 weeks.  You will not receive refills on this medication.   Side effects may include nausea.  May not be prescribed if you are on a more potent blood thinner than aspirin or have chronic kidney disease.    BLOOD THINNER    ___X_ Blood Thinner   Aspirin, eliquis  or xarelto has been prescribed as a blood thinner to prevent blood clots in your leg or lungs. Take as prescribed on the bottle for 4 weeks. You will not receive a refill on this medication.  Do not take this medication if you are on another blood thinner.  Do not take any additional anti-inflammatory medications while taking Aspirin or another blood thinning medication.  Examples may include, Meloxicam, Celebrex, Ibuprofen, Motrin, Aleve, or Advil.     ANTI NAUSEA    ___X_ Pantoprazole (Protonix)  Pantoprazole has been prescribed to help with nausea and protect your stomach while taking pain medication. Take one 40 mg tablet once daily for 4 weeks. You will not receive a refill on this medication.    ___X_ Zofran (Ondansetron)  Zofran has been prescribed to help with nausea and protect your stomach while taking pain medication. Take one 4 mg tablet every eight hours as needed for nausea. Refills will be provided as necessary.      STOOL SOFTENERS    ___X_ Colace (Docusate Sodium) and Senna (Sennoside)  Post-operative constipation can result due to a combination of inactivity, anesthesia and pain medication. To help prevent this, you should increase your water and fiber intake. Physical activity such as walking will also help stimulate the bowels.   Colace has been prescribed to help with constipation while on Oxycodone and Tramadol. Take one 100 mg tablet twice daily for 4 weeks. No refills needed.  Senna has been prescribed in combination with Colace to help with constipation while taking your pain medications.  Take one 8.6mg Tablet once daily.      You will not receive refills on the following medications:  Acetaminophen (Tylenol)  Miralax  Colace  Pantoprazole  Blood Thinner  Pain Medication Refills -[phone] or MyChart- Monday through Friday 7am-1pm    Medication refills will be sent upon receipt of your request during the times listed above. Due to the high call volume, you will not receive a call  confirming prescription refills; please do not call multiple times.  Prescription refills may take a few hours to process, you may follow up with your pharmacy for pickup availability.    SAMPLE              The times below are an example of how to organize medications to optimize pain control  Your actual medication schedule may vary based on your last dose taken IN THE HOSPITAL      Time 3:00am 6:00am 9:00am 12:00pm 3:00pm 6:00pm 9:00pm 12:00am   Medications Tramadol Acetaminophen (Tylenol)   Oxycodone  Miralax   Blood Thinner  Colace  Pantoprazole  Tramadol Acetaminophen (Tylenol)   Oxycodone Tramadol Acetaminophen (Tylenol)   Oxycodone  Miralax Blood Thinner  Colace  Tramadol   Acetaminophen (Tylenol)   Oxycodone            You may begin to wean off the pain medication as your pain remains controlled with increased activity.  The schedules provided are meant to serve as an example.  You may wean off based on your pain control.  Please note that pain medications are not filled beyond 6 weeks after surgery.              The times below are an example of how to WEAN OFF medications WHILE CONTINUING TO OPTIMIZE PAIN CONTROL.  Your actual medication schedule may vary based on your last dose taken.  Time 12:00am 4:00am 8:00am 12:00pm 4:00pm 8:00pm   Med Tramadol Oxycodone   Tramadol Oxycodone Tramadol Oxycodone     Time 12:00am 6:00am 12:00pm 6:00pm   Med Tramadol Oxycodone   Tramadol Oxycodone     Time 12:00am 8:00am 4:00pm   Med Tramadol Oxycodone   Tramadol     Time 12:00am 12:00pm   Med Tramadol Tramadol

## 2023-12-15 NOTE — CARE PLAN
Problem: Balance  Goal: LTG - Patient will maintain standing and sitting balance to allow for completion of daily activities  Outcome: Progressing     Problem: Mobility  Goal: LTG - Patient will ambulate community distance  Outcome: Progressing     Problem: Safety  Goal: LTG - Patient will demonstrate safety requirements appropriate to situation/environment  Outcome: Progressing     Problem: Transfers  Goal: LTG - Patient will transfer from one surface to another  Outcome: Progressing  Goal: LTG - Patient will demonstrate safe transfer techniques  Outcome: Progressing     Problem: Pain  Goal: LTG - Patient will manage pain with the appropriate technique/Intervention  Outcome: Progressing     Problem: Compromised Skin Integrity  Goal: LTG - Patient will be free from infection  Outcome: Progressing

## 2023-12-15 NOTE — PERIOPERATIVE NURSING NOTE
Patient has finished working w/ PT and completing stairs. Pt. Is cleared to be discharged to home. Will notify pharmacy.

## 2023-12-16 ENCOUNTER — HOME HEALTH ADMISSION (OUTPATIENT)
Dept: HOME HEALTH SERVICES | Facility: HOME HEALTH | Age: 73
End: 2023-12-16
Payer: MEDICARE

## 2023-12-16 ENCOUNTER — HOME CARE VISIT (OUTPATIENT)
Dept: HOME HEALTH SERVICES | Facility: HOME HEALTH | Age: 73
End: 2023-12-16
Payer: MEDICARE

## 2023-12-16 VITALS
DIASTOLIC BLOOD PRESSURE: 64 MMHG | TEMPERATURE: 97.5 F | HEART RATE: 53 BPM | RESPIRATION RATE: 18 BRPM | SYSTOLIC BLOOD PRESSURE: 100 MMHG

## 2023-12-16 PROCEDURE — G0151 HHCP-SERV OF PT,EA 15 MIN: HCPCS | Mod: HHH

## 2023-12-16 PROCEDURE — 1090000002 HH PPS REVENUE DEBIT

## 2023-12-16 PROCEDURE — 0023 HH SOC

## 2023-12-16 PROCEDURE — 169592 NO-PAY CLAIM PROCEDURE

## 2023-12-16 PROCEDURE — 1090000001 HH PPS REVENUE CREDIT

## 2023-12-16 ASSESSMENT — BALANCE ASSESSMENTS
EYES CLOSED AT MAXIMUM POSITION NUDGED: 0 - UNSTEADY
TURNING 360 DEGREES STEPS: 0 - DISCONTINUOUS STEPS
SITTING DOWN: 1 - USES ARMS OR NOT SMOOTH MOTION
STANDING BALANCE: 1 - STEADY BUT WIDE STANCE AND USES CANE OR OTHER SUPPORT
ARISING SCORE: 1
ARISES: 1 - ABLE, USES ARMS TO HELP
BALANCE SCORE: 8
NUDGED SCORE: 2
SITTING BALANCE: 0 - LEANS OR SLIDES IN CHAIR
NUDGED: 2 - STEADY
IMMEDIATE STANDING BALANCE FIRST 5 SECONDS: 1 - STEADY BUT USES WALKER OR OTHER SUPPORT
ATTEMPTS TO ARISE: 2 - ABLE TO RISE, ONE ATTEMPT

## 2023-12-16 ASSESSMENT — GAIT ASSESSMENTS
WALKING STANCE: 0 - HEELS APART
PATH: 1 - MILD/MODERATE DEVIATION OR USES WALKING AID
INITIATION OF GAIT IMMEDIATELY AFTER GO: 0 - ANY HESITANCY OR MULTIPLE ATTEMPTS TO START
BALANCE AND GAIT SCORE: 12
GAIT SCORE: 4
TRUNK: 0 - MARKED SWAY OR USES WALKING AID
STEP SYMMETRY: 0 - RIGHT AND LEFT STEP LENGTH NOT EQUAL
STEP CONTINUITY: 0 - STOPPING OR DISCONTINUITY BETWEEN STEPS
TRUNK SCORE: 0
PATH SCORE: 1

## 2023-12-16 ASSESSMENT — ENCOUNTER SYMPTOMS
PAIN: 1
PAIN LOCATION: LEFT KNEE
PAIN SEVERITY GOAL: 0/10
HYPERTENSION: 1
SUBJECTIVE PAIN PROGRESSION: UNCHANGED
PAIN LOCATION - PAIN SEVERITY: 4/10
HIGHEST PAIN SEVERITY IN PAST 24 HOURS: 8/10
LOWEST PAIN SEVERITY IN PAST 24 HOURS: 4/10
PERSON REPORTING PAIN: PATIENT

## 2023-12-16 ASSESSMENT — ACTIVITIES OF DAILY LIVING (ADL)
OASIS_M1830: 05
ENTERING_EXITING_HOME: STAND BY ASSIST

## 2023-12-16 NOTE — HOME HEALTH
S: This pt was referred to home health PT following L TKA. She is demonstrating increased difficulty with mobility as a result.    B: Pt lives with her  and 2 sons in a multistory house with 5 stairs to exit the building. Her bedroom is on the 2nd floor with 14 stairs to navigate. Pt is using a wheeled walker with SBA for all ambulation but was independent with ambulation without use of assistive device prior to current episode of care. Pt reports no recent falls and rates current pain level at 8/10. Medications reconciled in the home with pill bottles and educated on risks involved with use of aspirin and oxycodone.    A: Pt presents with decreased L knee ROM at 95 degrees flexion, L LE weakness affecting gait / stairs and balance, as well as gait and balance impairment as illustrated by Tinetti score of 12/28. Pt ambulates using wheeled walker with SBA demonstrating shortened R stride length, decreased L stance phase time, inconsistent rhythm and instability. Walker is slightly too low for pt, but it is at its highest setting. Pt instructed in proper use of cane which is at appropriate height. Pt navigates 8 stairs up and down using cane with SBA demonstrating appropriate step to pattern after initial instruction. Pt educated in signs and symptoms of DVT and infection and instructed to seek immediate medical attention should any arise. Reviewed HEP issued from hospital and instructed in proper technique and frequency. Instructed pt to ambulate every 1-2 hours. Dressing intact with no seepage or signs of infection noted.    R: Pt will benefit from skilled PT for L knee ROM / stretching, LE strengthening to facilitate gait / stairs and balance, as well as transfer, gait and balance training to improve functional mobility and independence.

## 2023-12-16 NOTE — Clinical Note
PT start of care completed today. She is doing fantastic with good mobility and ROM of 95 degrees flexion and full extension. We will see her twice weekly for 3 weeks starting next week, and then discharge the 4th week, but I recommended she call Monday to get scheduled for outpatient PT, and if she is able to start sooner than our scheduled discharge, we can always discharge earlier. I think she will be bored with what we can do with her pretty quickly, since she is doing so well.

## 2023-12-17 PROCEDURE — 1090000001 HH PPS REVENUE CREDIT

## 2023-12-17 PROCEDURE — 1090000002 HH PPS REVENUE DEBIT

## 2023-12-18 PROCEDURE — 1090000001 HH PPS REVENUE CREDIT

## 2023-12-18 PROCEDURE — 1090000002 HH PPS REVENUE DEBIT

## 2023-12-19 ENCOUNTER — HOME CARE VISIT (OUTPATIENT)
Dept: HOME HEALTH SERVICES | Facility: HOME HEALTH | Age: 73
End: 2023-12-19
Payer: MEDICARE

## 2023-12-19 VITALS
SYSTOLIC BLOOD PRESSURE: 108 MMHG | TEMPERATURE: 96.9 F | HEART RATE: 56 BPM | RESPIRATION RATE: 16 BRPM | DIASTOLIC BLOOD PRESSURE: 67 MMHG

## 2023-12-19 PROCEDURE — G0151 HHCP-SERV OF PT,EA 15 MIN: HCPCS | Mod: HHH

## 2023-12-19 PROCEDURE — 1090000002 HH PPS REVENUE DEBIT

## 2023-12-19 PROCEDURE — 1090000001 HH PPS REVENUE CREDIT

## 2023-12-19 NOTE — HOME HEALTH
S: PT follow up visit. Pt being seen for decreased functional mobility related to L TKA.    B: Pt reports she had increased pain and stiffness over the past 2 days but is feeling better today. She reports no falls, no change in medications, and rates current pain level at 0/10. She has been working on the HEP and notices increased stiffness during supine heel slides.    A: Pt performed Supine heel slides, SAQ, SLR in hip flexion, and Seated LAQ 10x each. Pt instructed in and performed standing heel raises, marching, L hamtring curls and L hip abduction 10x each. Pt with good progress in ROM to 99 degrees flexion.     R: Pt demonstrating improved ROM and requires continued PT for further strengthening, gait and balance training to improve functional mobility and endurance.

## 2023-12-20 PROCEDURE — 1090000002 HH PPS REVENUE DEBIT

## 2023-12-20 PROCEDURE — 1090000001 HH PPS REVENUE CREDIT

## 2023-12-21 PROCEDURE — 1090000002 HH PPS REVENUE DEBIT

## 2023-12-21 PROCEDURE — 1090000001 HH PPS REVENUE CREDIT

## 2023-12-22 ENCOUNTER — HOME CARE VISIT (OUTPATIENT)
Dept: HOME HEALTH SERVICES | Facility: HOME HEALTH | Age: 73
End: 2023-12-22
Payer: MEDICARE

## 2023-12-22 VITALS
DIASTOLIC BLOOD PRESSURE: 66 MMHG | RESPIRATION RATE: 16 BRPM | SYSTOLIC BLOOD PRESSURE: 100 MMHG | TEMPERATURE: 97.3 F | HEART RATE: 84 BPM

## 2023-12-22 PROCEDURE — 1090000002 HH PPS REVENUE DEBIT

## 2023-12-22 PROCEDURE — G0151 HHCP-SERV OF PT,EA 15 MIN: HCPCS | Mod: HHH

## 2023-12-22 PROCEDURE — 1090000001 HH PPS REVENUE CREDIT

## 2023-12-22 ASSESSMENT — ENCOUNTER SYMPTOMS
PAIN: 1
PAIN LOCATION - PAIN SEVERITY: 7/10
PAIN LOCATION: LEFT KNEE
PAIN SEVERITY GOAL: 0/10
PERSON REPORTING PAIN: PATIENT
LOWEST PAIN SEVERITY IN PAST 24 HOURS: 4/10
SUBJECTIVE PAIN PROGRESSION: UNCHANGED
HIGHEST PAIN SEVERITY IN PAST 24 HOURS: 7/10

## 2023-12-23 PROCEDURE — 1090000001 HH PPS REVENUE CREDIT

## 2023-12-23 PROCEDURE — 1090000002 HH PPS REVENUE DEBIT

## 2023-12-23 NOTE — HOME HEALTH
S: PT follow up visit. Pt being seen for decreased functional mobility related to L TKA    B: Pt reports her knee still feels stiff. She has been working on the HEP. She reports no falls, no change in medications, and rates current pain level at 7/10.    A: Pt performed standing B LE AROM exercises 10x. PT removed mepilex dressing. Steri strips intact with no signs of infection noted. Pt has old dried blood at the distal end of the incision. PT cleaned some off with alcohol pad. Pt instructed to keep area clean and dry, that she can still shower but not to scrub incision and to pat dry. Pt has copious bruising throughout her L LE, but swelling is WNL for this time period. Pt reinstructed to seek immediate medical attention should any signs of infection or DVT arise. Pt voices good understanding. Instructed to perform L knee ROM exercises frequently but not overly forcefully.    R: Pt demonstrating improved  and requires continued PT for further strengthening, gait and balance training to improve functional mobility and .

## 2023-12-24 PROCEDURE — 1090000002 HH PPS REVENUE DEBIT

## 2023-12-24 PROCEDURE — 1090000001 HH PPS REVENUE CREDIT

## 2023-12-25 PROCEDURE — 1090000001 HH PPS REVENUE CREDIT

## 2023-12-25 PROCEDURE — 1090000002 HH PPS REVENUE DEBIT

## 2023-12-26 ENCOUNTER — HOME CARE VISIT (OUTPATIENT)
Dept: HOME HEALTH SERVICES | Facility: HOME HEALTH | Age: 73
End: 2023-12-26
Payer: MEDICARE

## 2023-12-26 VITALS
DIASTOLIC BLOOD PRESSURE: 68 MMHG | TEMPERATURE: 97.6 F | HEART RATE: 68 BPM | SYSTOLIC BLOOD PRESSURE: 110 MMHG | RESPIRATION RATE: 16 BRPM

## 2023-12-26 PROCEDURE — 1090000002 HH PPS REVENUE DEBIT

## 2023-12-26 PROCEDURE — 1090000001 HH PPS REVENUE CREDIT

## 2023-12-26 PROCEDURE — G0151 HHCP-SERV OF PT,EA 15 MIN: HCPCS | Mod: HHH

## 2023-12-26 NOTE — HOME HEALTH
S: PT follow up visit. Pt being seen for decreased functional mobility related to L TKA    B: Pt reports her L knee still feels very stiff. She is working on the HEP daily. She  reports no falls, no change in medications, and rates current pain level at 5/10.    A: Pt performed supine and seated L LE ROM exercises, and standing LE AROM exercises on B LE today to focus on increasing L LE weight bearing while performing exercises with R LE.ROM at 90 degrees flexion today with full extension.    R: Pt demonstrating improved exercise tolerance and requires continued PT for further strengthening, gait and balance training to improve functional mobility and endurance.

## 2023-12-27 PROCEDURE — 1090000002 HH PPS REVENUE DEBIT

## 2023-12-27 PROCEDURE — 1090000001 HH PPS REVENUE CREDIT

## 2023-12-28 ENCOUNTER — HOME CARE VISIT (OUTPATIENT)
Dept: HOME HEALTH SERVICES | Facility: HOME HEALTH | Age: 73
End: 2023-12-28
Payer: MEDICARE

## 2023-12-28 VITALS
RESPIRATION RATE: 16 BRPM | TEMPERATURE: 98.4 F | SYSTOLIC BLOOD PRESSURE: 118 MMHG | DIASTOLIC BLOOD PRESSURE: 70 MMHG | HEART RATE: 65 BPM

## 2023-12-28 PROCEDURE — 1090000002 HH PPS REVENUE DEBIT

## 2023-12-28 PROCEDURE — 1090000001 HH PPS REVENUE CREDIT

## 2023-12-28 PROCEDURE — G0151 HHCP-SERV OF PT,EA 15 MIN: HCPCS | Mod: HHH

## 2023-12-28 ASSESSMENT — ENCOUNTER SYMPTOMS
PAIN SEVERITY GOAL: 0/10
HIGHEST PAIN SEVERITY IN PAST 24 HOURS: 7/10
LOWEST PAIN SEVERITY IN PAST 24 HOURS: 4/10
PERSON REPORTING PAIN: PATIENT
SUBJECTIVE PAIN PROGRESSION: GRADUALLY IMPROVING
PAIN LOCATION: LEFT KNEE
PAIN LOCATION - PAIN SEVERITY: 6/10
PAIN: 1

## 2023-12-29 PROCEDURE — 1090000002 HH PPS REVENUE DEBIT

## 2023-12-29 PROCEDURE — 1090000001 HH PPS REVENUE CREDIT

## 2023-12-29 NOTE — HOME HEALTH
S: PT follow up visit. Pt being seen for decreased functional mobility related to L TKA.    B: Pt reports she is doing well and has been working on bending her knee more. She reports no falls, no change in medications, and rates current pain level at 6/10.    A: Pt performed supine and seated L LE AROM exercises 15x each, and standing AROM exercises 15 times on L LE and 10 times on R LE. PT performed manual therapy to L knee for PROM in flexion and extension with good progress in ROM to 98 degrees flexion.    R: Pt demonstrating improved ROM and requires continued PT for further strengthening, gait and balance training to improve functional mobility and endurance.

## 2023-12-30 PROCEDURE — 1090000001 HH PPS REVENUE CREDIT

## 2023-12-30 PROCEDURE — 1090000002 HH PPS REVENUE DEBIT

## 2023-12-31 PROCEDURE — 1090000002 HH PPS REVENUE DEBIT

## 2023-12-31 PROCEDURE — 1090000001 HH PPS REVENUE CREDIT

## 2024-01-01 PROCEDURE — 1090000002 HH PPS REVENUE DEBIT

## 2024-01-01 PROCEDURE — 1090000001 HH PPS REVENUE CREDIT

## 2024-01-02 ENCOUNTER — HOME CARE VISIT (OUTPATIENT)
Dept: HOME HEALTH SERVICES | Facility: HOME HEALTH | Age: 74
End: 2024-01-02
Payer: MEDICARE

## 2024-01-02 VITALS
DIASTOLIC BLOOD PRESSURE: 72 MMHG | RESPIRATION RATE: 16 BRPM | TEMPERATURE: 97.3 F | SYSTOLIC BLOOD PRESSURE: 112 MMHG | HEART RATE: 60 BPM

## 2024-01-02 PROCEDURE — 1090000001 HH PPS REVENUE CREDIT

## 2024-01-02 PROCEDURE — G0151 HHCP-SERV OF PT,EA 15 MIN: HCPCS | Mod: HHH

## 2024-01-02 PROCEDURE — 1090000002 HH PPS REVENUE DEBIT

## 2024-01-03 ENCOUNTER — OFFICE VISIT (OUTPATIENT)
Dept: ORTHOPEDIC SURGERY | Facility: CLINIC | Age: 74
End: 2024-01-03
Payer: MEDICARE

## 2024-01-03 DIAGNOSIS — Z96.652 AFTERCARE FOLLOWING LEFT KNEE JOINT REPLACEMENT SURGERY: Primary | ICD-10-CM

## 2024-01-03 DIAGNOSIS — Z47.1 AFTERCARE FOLLOWING LEFT KNEE JOINT REPLACEMENT SURGERY: Primary | ICD-10-CM

## 2024-01-03 PROCEDURE — 1090000001 HH PPS REVENUE CREDIT

## 2024-01-03 PROCEDURE — 1125F AMNT PAIN NOTED PAIN PRSNT: CPT | Performed by: STUDENT IN AN ORGANIZED HEALTH CARE EDUCATION/TRAINING PROGRAM

## 2024-01-03 PROCEDURE — 99024 POSTOP FOLLOW-UP VISIT: CPT | Performed by: STUDENT IN AN ORGANIZED HEALTH CARE EDUCATION/TRAINING PROGRAM

## 2024-01-03 PROCEDURE — 1159F MED LIST DOCD IN RCRD: CPT | Performed by: STUDENT IN AN ORGANIZED HEALTH CARE EDUCATION/TRAINING PROGRAM

## 2024-01-03 PROCEDURE — 1036F TOBACCO NON-USER: CPT | Performed by: STUDENT IN AN ORGANIZED HEALTH CARE EDUCATION/TRAINING PROGRAM

## 2024-01-03 PROCEDURE — 1090000002 HH PPS REVENUE DEBIT

## 2024-01-03 RX ORDER — CYCLOBENZAPRINE HCL 5 MG
5-10 TABLET ORAL NIGHTLY PRN
Qty: 30 TABLET | Refills: 0 | Status: SHIPPED | OUTPATIENT
Start: 2024-01-03 | End: 2024-01-25

## 2024-01-03 RX ORDER — OXYCODONE HYDROCHLORIDE 5 MG/1
5-10 TABLET ORAL EVERY 6 HOURS PRN
Qty: 40 TABLET | Refills: 0 | Status: SHIPPED | OUTPATIENT
Start: 2024-01-03 | End: 2024-01-10

## 2024-01-03 RX ORDER — TRAMADOL HYDROCHLORIDE 50 MG/1
50-100 TABLET ORAL EVERY 6 HOURS PRN
Qty: 40 TABLET | Refills: 0 | Status: SHIPPED | OUTPATIENT
Start: 2024-01-03 | End: 2024-01-10

## 2024-01-03 ASSESSMENT — PAIN - FUNCTIONAL ASSESSMENT: PAIN_FUNCTIONAL_ASSESSMENT: 0-10

## 2024-01-03 ASSESSMENT — PAIN SCALES - GENERAL: PAINLEVEL_OUTOF10: 3

## 2024-01-03 NOTE — HOME HEALTH
S: PT follow up visit. Pt being seen for decreased functional mobility related to L TKA.    B: Pt reports she had cramps in her L LE last night which then caused increased knee pain and she did not sleep well. She reports no falls, no change in medications, and rates current pain level at 6/10. She sees her surgeon tomorrow. She removed the steri strips according to the directions she read in the discharge sheet.    A: Pt instructed in and performed seated L LE PREs, standing B LE PREs, and standing Rows and B trunk rotation using green resistance band 10x each. PT performed manual therapy to L knee for PROM in flexion and extension. Little change from last visit.    R: Pt demonstrating improved strength and requires continued PT for further strengthening, gait and balance training to improve functional mobility and endurance.

## 2024-01-03 NOTE — PROGRESS NOTES
Diagnosis: End stage osteoarthritis Left Knee  Procedure Performed: Left Total Knee Arthroplasty   Date of Surgery: 12/15/23    Subjective:  Megha Vivas is here for regularly scheduled follow-up after the above procedure. The patient has no specific complaints other than normal 2-week postoperative pain.  She has been using oxycodone and tramadol up to this point to control her pain, but has now run out of her prescriptions.  The patient has been compliant with DECLAN compression stocking as prescribed. They have been working with home physical therapy and have progressed to outpatient PT. The patient is ambulatory with a walker. The patient denies: fevers, chills, incisional drainage, joint instability, chest or calf pain, shortness of breath, and night sweats. The patient has been compliant with their prescribed [aspirin] for VTE prophylaxis. There are no complaints of numbness or tingling in the operative extremity.     Physical Examination:   General: Patient is alert and oriented x 3 and appears comfortable.  Gait: Patient ambulates with slight antalgic gait.  Wound: Incision is well healed. There is mild warmth and effusion about the knee, both consistent with the normal post-operative healing. There is no erythema, incisional drainage, fluctuance, or suture abscess.   Knee: ROM 5-90  Stable to varus and valgus stress at 0 and 30 degrees.   Patella tracks midline  Expected post operative swelling and tenderness   Calf: No swelling or tenderness  Able to dorsi-flex and plantar-flex the ankle with appropriate strength. Able to wiggle all toes.   The foot is warm and well perfused with palpable pulses.   Sensation is intact to light touch.     Radiographs: None today    Assessment and Plan: Megha Vivas is progressing well approximately 2 weeks s/p left total knee arthroplasty. I am satisfied with the patient's recovery to this point.     1. A thorough discussion was had with the patient concerning the  postoperative course and the patient is in agreement with the plan.  2. Continued physical therapy with gait training to improve strength and stamina. Progress off support as tolerated.   3. Continue with current pain regimen.  Will prescribe her another course of oxycodone and tramadol to help with her outpatient physical therapy.    4. Reviewed the need for prophylactic antibiotics prior to any dental or other invasive procedures.  5. No swimming or tub bathing until 3 months post op  6. Follow-up in 4 weeks with radiographs or sooner if there are any concerns.    The patient was seen and examined with my resident Dr. De La Garza who assisted with documentation.  I independently interviewed the patient to obtain a history and performed physical examination.  I formulated the plan of care.    This office note was dictated using Dragon voice to text software and was not proofread for spelling or grammatical errors

## 2024-01-04 ENCOUNTER — HOME CARE VISIT (OUTPATIENT)
Dept: HOME HEALTH SERVICES | Facility: HOME HEALTH | Age: 74
End: 2024-01-04
Payer: MEDICARE

## 2024-01-04 VITALS
DIASTOLIC BLOOD PRESSURE: 76 MMHG | SYSTOLIC BLOOD PRESSURE: 116 MMHG | HEART RATE: 66 BPM | RESPIRATION RATE: 16 BRPM | TEMPERATURE: 97.3 F

## 2024-01-04 PROCEDURE — 1090000002 HH PPS REVENUE DEBIT

## 2024-01-04 PROCEDURE — 1090000003 HH PPS REVENUE ADJ

## 2024-01-04 PROCEDURE — G0151 HHCP-SERV OF PT,EA 15 MIN: HCPCS | Mod: HHH

## 2024-01-04 PROCEDURE — 1090000001 HH PPS REVENUE CREDIT

## 2024-01-04 ASSESSMENT — BALANCE ASSESSMENTS
ATTEMPTS TO ARISE: 2 - ABLE TO RISE, ONE ATTEMPT
SITTING BALANCE: 1 - STEADY, SAFE
EYES CLOSED AT MAXIMUM POSITION NUDGED: 1 - STEADY
TURNING 360 DEGREES STEPS: 0 - DISCONTINUOUS STEPS
NUDGED: 2 - STEADY
STANDING BALANCE: 2 - NARROW STANCE WITHOUT SUPPORT
ARISING SCORE: 1
BALANCE SCORE: 13
SITTING DOWN: 1 - USES ARMS OR NOT SMOOTH MOTION
IMMEDIATE STANDING BALANCE FIRST 5 SECONDS: 2 - STEADY WITHOUT WALKER OR OTHER SUPPORT
NUDGED SCORE: 2
ARISES: 1 - ABLE, USES ARMS TO HELP

## 2024-01-04 ASSESSMENT — GAIT ASSESSMENTS
PATH SCORE: 1
STEP CONTINUITY: 1 - STEPS APPEAR CONTINUOUS
WALKING STANCE: 1 - HEELS ALMOST TOUCHING WHILE WALKING
GAIT SCORE: 8
PATH: 1 - MILD/MODERATE DEVIATION OR USES WALKING AID
BALANCE AND GAIT SCORE: 21
TRUNK: 0 - MARKED SWAY OR USES WALKING AID
STEP SYMMETRY: 1 - RIGHT AND LEFT STEP LENGTH APPEAR EQUAL
TRUNK SCORE: 0
INITIATION OF GAIT IMMEDIATELY AFTER GO: 0 - ANY HESITANCY OR MULTIPLE ATTEMPTS TO START

## 2024-01-04 ASSESSMENT — ENCOUNTER SYMPTOMS
PERSON REPORTING PAIN: PATIENT
LOWEST PAIN SEVERITY IN PAST 24 HOURS: 3/10
HIGHEST PAIN SEVERITY IN PAST 24 HOURS: 6/10
PAIN LOCATION - PAIN SEVERITY: 5/10
PAIN SEVERITY GOAL: 0/10
PAIN: 1
SUBJECTIVE PAIN PROGRESSION: GRADUALLY IMPROVING
PAIN LOCATION: LEFT KNEE

## 2024-01-04 ASSESSMENT — ACTIVITIES OF DAILY LIVING (ADL)
HOME_HEALTH_OASIS: 00
OASIS_M1830: 01

## 2024-01-04 NOTE — Clinical Note
Pt discharged from home health today. Her L knee ROM has not progressed since start of care, but was at a pretty good position at that time. She is at 96 degrees flexion and nearly full extension. She starts outpatient PT next week.

## 2024-01-04 NOTE — HOME HEALTH
S: PT reassessment for discharge. Pt being seen for decreased mobility related to L TKA.    B: Pt reports she saw her surgeon yesterday who is pleased with her progress. She states she is feeling a little better today. The doctor gave her flexeril for the muscle spasms at night. She reports no falls, no other change in medications, and rates current pain level at 5/10. Pt has pictoral handouts for HEP and is comfortable with performing on her own until she starts outpatient PT next week.    A: Pt demonstrates good improvement in functional mobility as illustrated by improved gait technique, endurance and independence and improved Tinetti score from 12/28 on SOC to 21/28 today. Pt ambulates safely and independently using cane or wheeled walker within her home demonstrating improved technique with increased B stride lengths, improved consistency of rhythm and stability. Pt has shown good improvement in L knee strength, and her ROM has not progressed much but was doing well from the start and is still at a relatively good point, at 96 degrees flexion and nearly full extension. Pt instructed in gentle scar tissue massage to improve ROM and mobility. PT performed manual therapy to L knee for scar massage and PROM / stretching in flexion and extension. Pt is comfortable and independent with HEP and appropriate for discharge from home health at this time and transition to outpatient PT next week.    P: Pt has met goals and is in agreement with discharge from home health PT today.

## 2024-01-09 PROCEDURE — G0180 MD CERTIFICATION HHA PATIENT: HCPCS | Performed by: STUDENT IN AN ORGANIZED HEALTH CARE EDUCATION/TRAINING PROGRAM

## 2024-01-10 ENCOUNTER — APPOINTMENT (OUTPATIENT)
Dept: ORTHOPEDIC SURGERY | Facility: CLINIC | Age: 74
End: 2024-01-10
Payer: MEDICARE

## 2024-01-10 PROBLEM — Z47.89 ORTHOPEDIC AFTERCARE: Status: ACTIVE | Noted: 2024-01-10

## 2024-01-10 PROBLEM — M25.562 LEFT KNEE PAIN: Status: ACTIVE | Noted: 2024-01-10

## 2024-01-10 NOTE — PROGRESS NOTES
Physical Therapy  Physical Therapy Orthopedic Evaluation    Patient Name: Megha Vivas  MRN: [id number]  Today's Date: 1/11/2024  Referred By: Alfred  Insurance: [employer] Medicare; $0 co-pay  Visit Limit: MN  Visit: 1    Time Calculation  Start Time: 1006  Stop Time: 1040  Time Calculation (min): 34 min    Current Problem  1. Left knee pain  Follow Up In Physical Therapy      2. Orthopedic aftercare  Follow Up In Physical Therapy      3. Primary osteoarthritis of left knee  Referral to Physical Therapy           Precautions: low fall risk          Subjective:   Subjective   Chief Complaint: s/p L TKA   Onset: 12/15/23  TALYA: arthritis    Pt reports to session s/p L TKA on 12/15/23. She reports need for surgery being due to arthritis. Participated in home health therapy until last week where exercises focused on improving range of motion and supine, seated strengthening. Pt has been walking with walker, cane and is hoping to return to independent ambulation. She is able to walk short distances within the house without a device but has antalgic gait pattern. She denies any recent falls. Mentions mild instability of knee since surgery but otherwise doing well. Pt is hoping to improve to PLOF.    Current Condition: Better    PAIN  Intensity (0-10): 5/10 current; 8/10 worst  Location: anterior, medial knee  Description: aching    Aggravating Factors:  movement, bending,   Relieving Factors:  rest, ice, massage    Relevant Information (PMH & Previous Tests/Imaging): xray  Previous Interventions/Treatments: n/a    Prior Level of Function (PLOF)  Exercise/Physical Activity: independent    Red Flags: Do you have any of the following? no  Fever/chills, unexplained weight changes, dizziness/fainting, unexplained change in bowel or bladder functions, unexplained malaise or muscle weakness, night pain/sweats, numbness or tingling    Objective:  Objective   Observation: no outward signs of infection; incision appears  well-healed    Gait: with front wheeled walker    4 Stage Balance Test  Close stance: 30 sec  Semi-Tandem: 30 sec B  Tandem  R: 30 sec  L: 5 sec  Single Leg  R: 12 sec  L: 5 sec    Knee AROM  Flexion  R: 4+  L: 4-  Extension  R: 4+  L: 4-    LE MMT   Knee Flexion  R: 140 deg  L: 85 deg; PROM: 95 deg   Knee Extension  R: 0 deg   L: -5 deg    Flexibility  Hamstrings: mod tight B  Quads: min tight R, mod tight L    Palpation: tenderness noted along anterior, medial L knee    Circumferential Measurements  Mid-Patellar  R: 39 cm  L: 42 cm    Lower Extremity Functional Movements  Transfers: independent    Outcome Measures:  LEFS: 41    EDUCATION: home exercise program, plan of care, activity modifications, pain management, and injury pathology       Goals:  Active       PT Problem       PT Goal 1       Start:  01/11/24    Expected End:  02/25/24       In 3 weeks, pt will rate pain 0- on the numeric pain scale to allow for restful nights of sleep.  In 3 weeks, pt will be able to stand for  In 3 weeks, pt will be able to walk for         PT Goal 2       Start:  01/11/24    Expected End:  04/10/24       In 6 weeks, pt will be able to reciprocally negotiate stairs to second floor without pain.  In 6 weeks, pt will be independent with HEP.  In 6 weeks, pt's LEFS will be +8.             Treatments:   See below    HEP Provided:  Access Code: [email or web address]  URL: [email or web address]  Date: 01/11/2024  Prepared by: Rivera Bragg    Exercises  - Long Sitting Hamstring Stretch  - 2 x daily - 7 x weekly - 2 sets - 60 seconds hold  - Supine Heel Slide with Strap  - 2 x daily - 7 x weekly - 2 sets - 10 reps  - Seated Heel Slide  - 2 x daily - 7 x weekly - 2 sets - 10 reps  - Seated Passive Knee Extension  - 2 x daily - 7 x weekly - 2 sets  - Supine Active Straight Leg Raise  - 2 x daily - 7 x weekly - 2 sets - 10 reps  - Supine Knee Extension Strengthening  - 2 x daily - 7 x weekly - 2 sets - 10 reps - 3  hold    Assessment: Pt is a 72 y/o F s/p L TKA on 12/15/23. Pt displayed lack of knee range of motion, gross lower extremity strength as well as flexibility, pain and functional mobility deficits. Skilled physical therapy is necessary in order to improve aforementioned impairments to allow for increased participation in functional and recreational activities without limitations. Plan of care will consist of core, lower extremity stretching and strengthening in order to return to PLOF.     Plan: Continue to progress via post-op protocol.     Planned Interventions include: therapeutic exercise, self-care home management, manual therapy, therapeutic activities, gait training, neuromuscular coordination, aquatic therapy  Frequency: 1-2x/week  Duration: 6 weeks    Rivera Bragg, PT

## 2024-01-11 ENCOUNTER — EVALUATION (OUTPATIENT)
Dept: PHYSICAL THERAPY | Facility: CLINIC | Age: 74
End: 2024-01-11
Payer: MEDICARE

## 2024-01-11 DIAGNOSIS — M17.12 PRIMARY OSTEOARTHRITIS OF LEFT KNEE: ICD-10-CM

## 2024-01-11 DIAGNOSIS — Z47.89 ORTHOPEDIC AFTERCARE: ICD-10-CM

## 2024-01-11 DIAGNOSIS — M25.562 LEFT KNEE PAIN: Primary | ICD-10-CM

## 2024-01-11 PROCEDURE — 97110 THERAPEUTIC EXERCISES: CPT | Mod: GP

## 2024-01-11 PROCEDURE — 97161 PT EVAL LOW COMPLEX 20 MIN: CPT | Mod: GP

## 2024-01-16 NOTE — PROGRESS NOTES
Physical Therapy  Physical Therapy Treatment    Patient Name: Megha Vivas  MRN: [id number]  Today's Date: 1/17/2024  Referred By: Alfred  Insurance: [employer] Medicare; $0 co-pay  Visit Limit: MN  Visit: 2    Time Calculation  Start Time: 1402  Stop Time: 1442  Time Calculation (min): 40 min    Current Problem  1. Orthopedic aftercare  Follow Up In Physical Therapy      2. Left knee pain  Follow Up In Physical Therapy        Pain: 2/10       Subjective:   Subjective   Patient reports that she has been doing well. Has been walking without the walker at home and is able to do so comfortably. Mention that she continues to have difficult time with knee flexion.    s/p 12/15/23    Objective:   Objective   LE AROM  Knee Flexion  R: 140 deg  L: 100 deg; PROM: 120 deg   Knee Extension  R: 0 deg   L: -3 deg    Treatments:     Exercise Sets/Reps   Nu step 5'   PROM: knee flexion, extension 10'   Heel propped extension w/ 8# weight 5'   3 part posterior chain stretch X 1' each   Gait training in hallway focusing on: weight shifting, heel strike, knee flexion 2'   Quad sets 2 x 10 w/ 5 sec holds   SLR 2 x 10   SAQ w/ foam roller 2 x 10 w/ 2 sec holds   Sit to stands 2 x 10   LAQ 3 x 10   Reviewed HEP    Access Code: [email or web address]     Exercises  - Long Sitting Hamstring Stretch  - 2 x daily - 7 x weekly - 2 sets - 60 seconds hold  - Supine Heel Slide with Strap  - 2 x daily - 7 x weekly - 2 sets - 10 reps  - Seated Heel Slide  - 2 x daily - 7 x weekly - 2 sets - 10 reps  - Seated Passive Knee Extension  - 2 x daily - 7 x weekly - 2 sets  - Supine Active Straight Leg Raise  - 2 x daily - 7 x weekly - 2 sets - 10 reps  - Supine Knee Extension Strengthening  - 2 x daily - 7 x weekly - 2 sets - 10 reps - 3 hold    Assessment: Pt's knee range of motion has significantly improved, more so with flexion since last session. However, pt continues to have lack of mobility due to pain, weakness. Utilized PROM, AAROM and AROM to improve knee  mobility as well as initiate weight bearing gross lower extremity strength. Pt was able to tolerate session with mild complaints of pain. Overall, pt tolerated treatment well. Continued to educate to perform HEP daily.        Plan: Continue to restore knee flexion, extension range of motion and progress strength as able per tolerance, protocol.       Rivera Bragg, PT

## 2024-01-17 ENCOUNTER — TREATMENT (OUTPATIENT)
Dept: PHYSICAL THERAPY | Facility: CLINIC | Age: 74
End: 2024-01-17
Payer: MEDICARE

## 2024-01-17 DIAGNOSIS — Z47.89 ORTHOPEDIC AFTERCARE: Primary | ICD-10-CM

## 2024-01-17 DIAGNOSIS — M25.562 LEFT KNEE PAIN: ICD-10-CM

## 2024-01-17 PROCEDURE — 97110 THERAPEUTIC EXERCISES: CPT | Mod: GP

## 2024-01-19 ENCOUNTER — TREATMENT (OUTPATIENT)
Dept: PHYSICAL THERAPY | Facility: CLINIC | Age: 74
End: 2024-01-19
Payer: MEDICARE

## 2024-01-19 DIAGNOSIS — M25.562 LEFT KNEE PAIN: ICD-10-CM

## 2024-01-19 DIAGNOSIS — Z47.89 ORTHOPEDIC AFTERCARE: Primary | ICD-10-CM

## 2024-01-19 PROCEDURE — 97110 THERAPEUTIC EXERCISES: CPT | Mod: GP

## 2024-01-19 NOTE — PROGRESS NOTES
Physical Therapy  Physical Therapy Treatment    Patient Name: Megha Vivas  MRN: [id number]  Today's Date: 1/19/2024  Referred By: Alfred  Insurance: [employer] Medicare; $0 co-pay  Visit Limit: MN  Visit: 3    Time Calculation  Start Time: 1335  Stop Time: 1414  Time Calculation (min): 39 min    Current Problem  1. Orthopedic aftercare  Follow Up In Physical Therapy      2. Left knee pain  Follow Up In Physical Therapy           Pain: 2/10    s/p 12/15/23       Subjective:   Subjective   Patient reports that she has been doing okay since Wednesday. Notes that she was sore after last session but has since subsided.     Objective:   Objective   LE AROM  Knee Flexion  R: 140 deg  L: 105 deg; AAROM: 115 deg; PROM: 120 deg   Knee Extension  R: 0 deg   L: -3 deg    Treatments:     Exercise Sets/Reps   Nu step 5'   PROM: knee flexion, extension 5'   Heel slides w/ strap 2 x 15    3 part posterior chain stretch X 1' each   Step knee flexion  2 x 15   TRX sit to stands w/ staggered stance 2 x 10   Cybex leg press 80# 2 x 15    tandem cw/ccw passes w/ 3# ball 2 x 10 each   6 inch step ups 2 x 8   Biodex machine 2'   Reviewed HEP    Access Code: [email or web address]       Assessment: Pt's knee flexion, extension range of motion continues to slightly improve but still limited secondary to pain, stiffness. Continued utilizing PROM, AAROM and AROM to improve knee flexion, extension range of motion with pain tolerance. Also introduced various lower extremity strengthening progressions to improve home safety and independence with transfers, gait. Pt tolerated treatment well.       Plan: Continue to improve knee flexion, extension range of motion and functional strength as able per protocol and pain.       Rivera Bragg, PT

## 2024-01-24 ENCOUNTER — TREATMENT (OUTPATIENT)
Dept: PHYSICAL THERAPY | Facility: CLINIC | Age: 74
End: 2024-01-24
Payer: MEDICARE

## 2024-01-24 DIAGNOSIS — Z47.89 ORTHOPEDIC AFTERCARE: ICD-10-CM

## 2024-01-24 DIAGNOSIS — M25.562 LEFT KNEE PAIN: Primary | ICD-10-CM

## 2024-01-24 PROCEDURE — 97110 THERAPEUTIC EXERCISES: CPT | Mod: GP

## 2024-01-24 NOTE — PROGRESS NOTES
Physical Therapy  Physical Therapy Treatment    Patient Name: Megha Vivas  MRN: [id number]  Today's Date: 1/24/2024  Referred By: Alfred  Insurance: [employer] Medicare; $0 co-pay  Visit Limit: MN  Visit: 4  Time Calculation  Start Time: 1315  Stop Time: 1358  Time Calculation (min): 43 min    Current Problem  1. Left knee pain  Follow Up In Physical Therapy      2. Orthopedic aftercare  Follow Up In Physical Therapy           Pain: 0/10       Subjective:   Subjective   Patient reports that she has been doing okay. Notes that she feels she is ready for 1x/week appointments rather than twice. Mentions that she has been riding her bike several times a day for about 10 minutes each.    s/p 12/15/23    Objective:   Objective   LE AROM  Knee Flexion  R: 140 deg  L: 111 deg; AAROM: 115 deg; PROM: 120 deg   Knee Extension  R: 0 deg   L: -1 deg    Treatments:     Exercise Sets/Reps   Nu step, moving seat closer every minute 5'   Physioball HS curls 2 x 15   Straight leg physioball bridges 2 x 10   bridges 2 x 10   Sit to stands 2 x 10   Cybex seated HS curl machine, knee flexion AAROM, 5# 2 x 15   Cybex seated hip abduction machine 33# 2 x 15   Cybex seated hip adduction machine 33# 2 x 15    Standing calf stretch 2 x 1'   Reviewed HEP    Access Code: [email or web address]     Access Code: [email or web address]  URL: [email or web address]  Date: 01/24/2024  Prepared by: Rivera Bragg    Exercises  - Long Sitting Hamstring Stretch  - 2 x daily - 7 x weekly - 2 sets - 60 seconds hold  - Supine Heel Slide with Strap  - 2 x daily - 7 x weekly - 2 sets - 10 reps  - Seated Heel Slide  - 2 x daily - 7 x weekly - 2 sets - 10 reps  - Seated Passive Knee Extension  - 2 x daily - 7 x weekly - 2 sets  - Supine Active Straight Leg Raise  - 2 x daily - 7 x weekly - 2 sets - 10 reps  - Supine Knee Extension Strengthening  - 2 x daily - 7 x weekly - 2 sets - 10 reps - 3 hold  - Bridge with Heels on Swiss Ball  - 2 x daily - 7 x weekly - 2  sets - 10 reps  - Supine Bridge with Heels on Swiss Ball and Knees Bent  - 2 x daily - 7 x weekly - 2 sets - 10 reps    Assessment: Pt's knee flexion and extension range of motion continue to slightly progress weekly but still lacking due to pain, weakness, lack of flexibility. Also introduced various hamstrings, glutes, quadriceps strengthening exercises using machines without issues. Encouraged pt to continue to perform HEP as well as continue with bike at home. Included exercise variations with physioball and included in HEP due to pt having physioball at home.        Plan: Progress knee mobility exercises to achieve end range of motion as well as gross lower extremity strength to improve participation in ADLs.    Next visit: lateral banded walks, clams, bosu lunges, standing quad stretch.       Rivera Bragg, PT

## 2024-01-26 ENCOUNTER — TREATMENT (OUTPATIENT)
Dept: PHYSICAL THERAPY | Facility: CLINIC | Age: 74
End: 2024-01-26
Payer: MEDICARE

## 2024-01-26 DIAGNOSIS — M25.562 LEFT KNEE PAIN: Primary | ICD-10-CM

## 2024-01-26 DIAGNOSIS — Z47.89 ORTHOPEDIC AFTERCARE: ICD-10-CM

## 2024-01-26 PROCEDURE — 97110 THERAPEUTIC EXERCISES: CPT | Mod: GP

## 2024-01-26 NOTE — PROGRESS NOTES
Physical Therapy  Physical Therapy Treatment    Patient Name: Megha Vivas  MRN: [id number]  Today's Date: 1/26/2024  Time Calculation  Start Time: 1245  Stop Time: 1325  Time Calculation (min): 40 min    Referred By: Alfred  Insurance: [employer] Medicare; $0 co-pay  Visit Limit: MN  Visit: 5    Current Problem  1. Left knee pain  Follow Up In Physical Therapy      2. Orthopedic aftercare  Follow Up In Physical Therapy             Pain: 0/10       Subjective:   Subjective   Patient reports that she continues to have stiffness, including specifically in the morning.     Objective:   Objective   LE AROM  Knee Flexion  R: 140 deg  L: 117 deg; PROM: 120 deg   Knee Extension  R: 0 deg   L: -5 deg      Treatments:     Exercise Sets/Reps   Recumbent bike 5'   Heel propped into extension passive stretch 5'   Heel slides w/ overpressure 2 x 20   Sit to stands, each from lower height 3 x 10   3 inch step ups 2 x 10   3 inch lateral step downs 2 x 10   Reviewed HEP    Access Code: [email or web address]       Assessment: Pt's knee flexion range of motion has improved, extension has not, but is still grossly limited compared to non-operative knee. Continued utilizing manual and AAROM to achieve range of motion gains. Continued with functional strengthening exercises, simulating stair negotiation and toilet sit to stand transfers. Pt tolerated session with mild complaints of pain.       Plan: Progress knee mobility, strength to improve functional ability.       Rivera Bragg, PT

## 2024-01-31 NOTE — PROGRESS NOTES
Physical Therapy  Physical Therapy Treatment    Patient Name: Megha Vivas  MRN: [id number]  Today's Date: 2/1/2024  Time Calculation  Start Time: 1350  Stop Time: 1430  Time Calculation (min): 40 min    Referred By: Alfred  Insurance: [employer] Medicare; $0 co-pay  Visit Limit: MN  Visit: 6    Current Problem  1. Left knee pain        2. Orthopedic aftercare               Pain: 2/10       s/p 12/15/23    Subjective:   Subjective   Patient reports that she is doing okay but continues to have pain in the posterior knee and difficulty with knee extension.    Objective:   Objective   LE AROM  Knee Flexion  R: 140 deg  L: 117 deg; PROM: 120 deg   Knee Extension  R: 0 deg   L: -5 deg    Treatments:     Exercise Sets/Reps   Upright bike 5'   Standing calf stretch 2 x 1'   Standing HS stretch 2 x 1'   Cybex seated HS curl knee flexion AAROM 2 x 15    Leg press 50# 3 x 15   Cybex TKE 5# 3 x 15   Reverse step ups 5# 3 x 10   Hep printout    Reviewed HEP    Access Code: [email or web address]     Access Code: [email or web address]  URL: [email or web address]  Date: 02/01/2024  Prepared by: Rivera Bragg    Exercises  - Long Sitting Hamstring Stretch  - 2 x daily - 7 x weekly - 2 sets - 60 seconds hold  - Supine Heel Slide with Strap  - 2 x daily - 7 x weekly - 2 sets - 10 reps  - Seated Heel Slide  - 2 x daily - 7 x weekly - 2 sets - 10 reps  - Supine Active Straight Leg Raise  - 2 x daily - 7 x weekly - 2 sets - 10 reps  - Bridge with Heels on Swiss Ball  - 2 x daily - 7 x weekly - 2 sets - 10 reps  - Supine Bridge with Heels on Swiss Ball and Knees Bent  - 2 x daily - 7 x weekly - 2 sets - 10 reps  - Standing Quad Set  - 2 x daily - 7 x weekly - 2 sets - 100 reps - 3-5 seconds hold  - Seated Passive Knee Extension  - 2 x daily - 7 x weekly - 2 sets      Assessment: Pt's range of motion continues to be lacking end range flexion and extension. Exercises focused on improving knee flexion, extension ROM with AAROM and AROM.  Challenged pt with standing knee extension exercises to improve quadriceps strength. Educated pt on importance of performing HEP up to 5x/daily to improve knee flexion, extension ROM.     Pt has follow up with MD and will adjust plan of care as necessary.    Plan: Progress knee mobility, stability to increase participation in ADLs.       Rivera Bragg, PT

## 2024-02-01 ENCOUNTER — TELEPHONE (OUTPATIENT)
Dept: HOME HEALTH SERVICES | Facility: HOME HEALTH | Age: 74
End: 2024-02-01

## 2024-02-01 ENCOUNTER — TREATMENT (OUTPATIENT)
Dept: PHYSICAL THERAPY | Facility: CLINIC | Age: 74
End: 2024-02-01
Payer: MEDICARE

## 2024-02-01 DIAGNOSIS — M25.562 LEFT KNEE PAIN: Primary | ICD-10-CM

## 2024-02-01 DIAGNOSIS — Z47.89 ORTHOPEDIC AFTERCARE: ICD-10-CM

## 2024-02-01 PROCEDURE — 97110 THERAPEUTIC EXERCISES: CPT | Mod: GP

## 2024-02-01 NOTE — TELEPHONE ENCOUNTER
Hi Dr. Eller,    Please sign your Post-OP note on 1.3.2024 so we can bill the insurance for homecare.

## 2024-02-07 ENCOUNTER — HOSPITAL ENCOUNTER (OUTPATIENT)
Dept: RADIOLOGY | Facility: CLINIC | Age: 74
Discharge: HOME | End: 2024-02-07
Payer: MEDICARE

## 2024-02-07 ENCOUNTER — OFFICE VISIT (OUTPATIENT)
Dept: ORTHOPEDIC SURGERY | Facility: CLINIC | Age: 74
End: 2024-02-07
Payer: MEDICARE

## 2024-02-07 VITALS — HEIGHT: 70 IN | WEIGHT: 191 LBS | BODY MASS INDEX: 27.35 KG/M2

## 2024-02-07 DIAGNOSIS — Z47.1 AFTERCARE FOLLOWING LEFT KNEE JOINT REPLACEMENT SURGERY: ICD-10-CM

## 2024-02-07 DIAGNOSIS — Z96.652 AFTERCARE FOLLOWING LEFT KNEE JOINT REPLACEMENT SURGERY: ICD-10-CM

## 2024-02-07 PROCEDURE — 1125F AMNT PAIN NOTED PAIN PRSNT: CPT | Performed by: STUDENT IN AN ORGANIZED HEALTH CARE EDUCATION/TRAINING PROGRAM

## 2024-02-07 PROCEDURE — 1159F MED LIST DOCD IN RCRD: CPT | Performed by: STUDENT IN AN ORGANIZED HEALTH CARE EDUCATION/TRAINING PROGRAM

## 2024-02-07 PROCEDURE — 73562 X-RAY EXAM OF KNEE 3: CPT | Mod: LEFT SIDE | Performed by: RADIOLOGY

## 2024-02-07 PROCEDURE — 73562 X-RAY EXAM OF KNEE 3: CPT | Mod: LT

## 2024-02-07 PROCEDURE — 1036F TOBACCO NON-USER: CPT | Performed by: STUDENT IN AN ORGANIZED HEALTH CARE EDUCATION/TRAINING PROGRAM

## 2024-02-07 PROCEDURE — 99024 POSTOP FOLLOW-UP VISIT: CPT | Performed by: STUDENT IN AN ORGANIZED HEALTH CARE EDUCATION/TRAINING PROGRAM

## 2024-02-07 NOTE — PROGRESS NOTES
Diagnosis: End stage osteoarthritis Left Knee  Procedure Performed: Left Total Knee Arthroplasty   Date of Surgery: December 15, 2023    Subjective:  Megha Vivas is here for regularly scheduled follow-up after the above procedure.  Patient reports occasional pain in the posterior aspect of her leg.  Otherwise, she is doing well.  No falls or trauma.  She is amatory without assistive device.  She has progressed to outpatient physical therapy.  She denies any joint stability.  No drainage from surgical incision.    There has been no interval change in this patient's past medical, surgical, medications, allergies, family history or social history since the most recent visit to a provider within our department.  14 point review of systems was performed, reviewed, and negative except for pertinent positives documented in the history of present illness.    Physical Examination:   General: Patient is alert and oriented x 3 and appears comfortable.  Gait: Patient ambulates with slight antalgic gait.  Wound: Incision is well healed. There is mild warmth and effusion about the knee, both consistent with the normal post-operative healing. There is no erythema, incisional drainage, fluctuance, or suture abscess.   Knee: ROM 5-110  Stable to varus and valgus stress at 0 and 30 degrees.   Patella tracks midline  Expected post operative swelling and tenderness   Calf: No swelling or tenderness  Able to dorsi-flex and plantar-flex the ankle with appropriate strength. Able to wiggle all toes.   The foot is warm and well perfused with palpable pulses.   Sensation is intact to light touch.     Radiographs: 3 views of the left knee were obtained and pending reviewed.  I discussed results with the patient.  Components appear to be appropriate position without evidence of failure.  Patella is tracking centrally.    Assessment and Plan: Megha Vivas is progressing well approximately 6 weeks s/p left total knee arthroplasty. I am  satisfied with the patient's recovery to this point.  Megha is slightly concerned about her inability to fully extend the knee.  She does have a fair amount of swelling.  I expect that she will be able to achieve full extension as the swelling resolves.    1. A thorough discussion was had with the patient concerning the postoperative course and the patient is in agreement with the plan.  2. Continued physical therapy with gait training to improve strength and stamina. Progress off support as tolerated.   3. Continue with current pain regimen.  Will prescribe her another course of oxycodone and tramadol to help with her outpatient physical therapy.    4. Reviewed the need for prophylactic antibiotics prior to any dental or other invasive procedures.  5. No swimming or tub bathing until 3 months post op  6. Follow-up in 10 months with radiographs or sooner if there are any concerns.    This office note was dictated using Dragon voice to text software and was not proofread for spelling or grammatical errors

## 2024-02-09 DIAGNOSIS — Z96.652 AFTERCARE FOLLOWING LEFT KNEE JOINT REPLACEMENT SURGERY: Primary | ICD-10-CM

## 2024-02-09 DIAGNOSIS — Z47.1 AFTERCARE FOLLOWING LEFT KNEE JOINT REPLACEMENT SURGERY: Primary | ICD-10-CM

## 2024-02-09 RX ORDER — OXYCODONE HYDROCHLORIDE 5 MG/1
5 TABLET ORAL EVERY 6 HOURS PRN
Qty: 28 TABLET | Refills: 0 | Status: SHIPPED | OUTPATIENT
Start: 2024-02-09 | End: 2024-02-16

## 2024-02-09 RX ORDER — TRAMADOL HYDROCHLORIDE 50 MG/1
50 TABLET ORAL EVERY 6 HOURS PRN
Qty: 28 TABLET | Refills: 0 | Status: SHIPPED | OUTPATIENT
Start: 2024-02-09 | End: 2024-02-16

## 2024-02-12 ENCOUNTER — TREATMENT (OUTPATIENT)
Dept: PHYSICAL THERAPY | Facility: CLINIC | Age: 74
End: 2024-02-12
Payer: MEDICARE

## 2024-02-12 DIAGNOSIS — Z47.89 ORTHOPEDIC AFTERCARE: ICD-10-CM

## 2024-02-12 DIAGNOSIS — M25.562 LEFT KNEE PAIN: Primary | ICD-10-CM

## 2024-02-12 PROCEDURE — 97110 THERAPEUTIC EXERCISES: CPT | Mod: GP

## 2024-02-12 PROCEDURE — 97016 VASOPNEUMATIC DEVICE THERAPY: CPT | Mod: GP

## 2024-02-12 PROCEDURE — 97112 NEUROMUSCULAR REEDUCATION: CPT | Mod: GP

## 2024-02-12 NOTE — PROGRESS NOTES
Physical Therapy  Physical Therapy Treatment    Patient Name: Megha Vivas  MRN: [id number]  Today's Date: 2/13/2024    Time Calculation  Start Time: 1330  Stop Time: 1425  Time Calculation (min): 55 min    Referred By: Alfred  Insurance: [employer] Medicare; $0 co-pay  Visit Limit: MN  Visit: 7    Current Problem  1. Left knee pain        2. Orthopedic aftercare             Pain: 0/10       Subjective:   Subjective   Patient reports that she has been doing well. Notes that descending stairs continues to be difficult but otherwise is doing fine. Recently met with MD, who says her knee is swollen is most likely limiting mobility. She has been walking without cane.    Objective:   Objective   Gait: slightly antalgic pattern without device    Treatments:    Exercise Sets/Reps   Recumbent bike 5'   6 inch step ups w/ 5# kb 2 x 20   3 inch frontal step downs w/ metronome 4 x 30''    Bosu squats w/ BUE support 2 x 15   RDL w/ 15# kb 2 x 10   Lateral banded walks w/ GTB w/ UE support 2 x 25 feet   SL cone taps 3 x 5   DBE 2 x 1'   Game ready L knee (mod compression) 15 min   Access Code: [email or web address]       Assessment: The focus of the session was Strengthening, ROM, Stretching, Balance, Motor Control, Dynamic Stability Training, Effusion management, and functional training. The pt demonstrated Good tolerance to the noted exercises today. The pt is demonstrated Good progress in skilled rehab at this time. The pt is still limited in overall Strength, ROM, Flexibility, Motor control, Balance, Gait mechanics, Effusion, and Pain at this time. The pt continues to be a good candidate for skilled PT, in order to further improve Strength, ROM, Flexibility, Motor control, Balance, Gait mechanics, Effusion, and Pain.          Plan: Continue to progress knee mobility, stability, gait and balance to return to PLOF.       Rivera Bragg, PT

## 2024-02-13 ENCOUNTER — APPOINTMENT (OUTPATIENT)
Dept: PHYSICAL THERAPY | Facility: CLINIC | Age: 74
End: 2024-02-13
Payer: MEDICARE

## 2024-02-21 ENCOUNTER — TREATMENT (OUTPATIENT)
Dept: PHYSICAL THERAPY | Facility: CLINIC | Age: 74
End: 2024-02-21
Payer: MEDICARE

## 2024-02-21 DIAGNOSIS — Z96.652 AFTERCARE FOLLOWING LEFT KNEE JOINT REPLACEMENT SURGERY: ICD-10-CM

## 2024-02-21 DIAGNOSIS — Z47.1 AFTERCARE FOLLOWING LEFT KNEE JOINT REPLACEMENT SURGERY: ICD-10-CM

## 2024-02-21 DIAGNOSIS — M25.562 LEFT KNEE PAIN: Primary | ICD-10-CM

## 2024-02-21 DIAGNOSIS — Z47.89 ORTHOPEDIC AFTERCARE: ICD-10-CM

## 2024-02-21 PROCEDURE — 97110 THERAPEUTIC EXERCISES: CPT | Mod: GP

## 2024-02-21 PROCEDURE — 97016 VASOPNEUMATIC DEVICE THERAPY: CPT | Mod: GP

## 2024-02-21 RX ORDER — CYCLOBENZAPRINE HCL 5 MG
5-10 TABLET ORAL NIGHTLY PRN
Qty: 30 TABLET | Refills: 0 | Status: SHIPPED | OUTPATIENT
Start: 2024-02-21 | End: 2024-03-07

## 2024-02-21 NOTE — PROGRESS NOTES
Physical Therapy  Physical Therapy Treatment    Patient Name: Megha Vivas  MRN: [id number]  Today's Date: 2/21/2024    Time Calculation  Start Time: 1215  Stop Time: 1306  Time Calculation (min): 51 min    Referred By: Alfred  Insurance: [employer] Medicare; $0 co-pay  Visit Limit: MN  Visit: 8      Current Problem  1. Left knee pain        2. Orthopedic aftercare             Pain: 0/10       Subjective:   Subjective   Patient reports that she has continued to have a difficult time with stair negotiation.    HEP Compliance: Good    Objective:   Objective   LE AROM  Knee Flexion  R: 140 deg  L: 115 deg; PROM: 120 deg   Knee Extension  R: 0 deg   L: -1 deg      Treatments:     Exercise Sets/Reps   Recumbent bike 5'   8 inch step ups w/ BUE support 2 x 15   6 inch frontal step downs 3 x 15     Cybex SL leg press 35# 3 x 10   Bridges  2 x 15 w/ 3 sec holds   Clams w/ GTB 2 x 15    Long sitting calf stretch x 1'   Long sitting HS stretch x 1'   Standing quad stretch 3 x 1'   Game ready L knee (mod compression) 10 min   Access Code: [email or web address]       Assessment: The focus of the session was Strengthening, Balance, Motor Control, Dynamic Stability Training, and functional training. The pt demonstrated Good tolerance to the noted exercises today. The pt is demonstrated Good progress in skilled rehab at this time. The pt is still limited in overall Strength, ROM, Motor control, Balance, Gait mechanics, and Pain at this time. The pt continues to be a good candidate for skilled PT, in order to further improve Strength, ROM, Motor control, Balance, Gait mechanics, and Pain.          Plan: Possible discharge within next two weeks.       Rivera Bragg, PT

## 2024-02-28 ENCOUNTER — TREATMENT (OUTPATIENT)
Dept: PHYSICAL THERAPY | Facility: CLINIC | Age: 74
End: 2024-02-28
Payer: MEDICARE

## 2024-02-28 DIAGNOSIS — Z47.89 ORTHOPEDIC AFTERCARE: Primary | ICD-10-CM

## 2024-02-28 DIAGNOSIS — M25.562 LEFT KNEE PAIN: ICD-10-CM

## 2024-02-28 PROCEDURE — 97110 THERAPEUTIC EXERCISES: CPT | Mod: GP

## 2024-02-28 PROCEDURE — 97016 VASOPNEUMATIC DEVICE THERAPY: CPT | Mod: GP

## 2024-02-28 PROCEDURE — 97112 NEUROMUSCULAR REEDUCATION: CPT | Mod: GP

## 2024-02-28 NOTE — PROGRESS NOTES
Physical Therapy  Physical Therapy Treatment    Patient Name: Megha Vivas  MRN: [id number]  Today's Date: 2/28/2024    Time Calculation  Start Time: 1530  Stop Time: 1320  Time Calculation (min): 1310 min    Referred By: Alfred  Ins[facility]: [facility]; $0 co-pay  Visit Limit: MN  Visit: 9    Current Problem  1. Orthopedic aftercare        2. Left knee pain             Pain: 0/10       Subjective:   Subjective   Patient reports that she has been okay. Expresses concern over range of motion.    HEP Compliance: Good    Objective:   Objective   Gait: slightly antalgic pattern due to lack of L terminal knee extension    Treatments:     Exercise Sets/Reps   Recumbent bike 5'   3 inch frontal step downs w/ metronome 3 x 1', 50 bpm   Sport cord walking variations  -forward  -lateral  -backward 5'   Squats w/ BUE support 2 x 15   Standing hip abduction w/ BUE support 2 x 15   Standing hip extension w/ BUE support 2 x 15   2-1 cybex leg press 60# 3 x 15   Standing HS stretch 2 x 1'   Game ready L knee (mod compression) 10 min   Access Code: [email or web address]     Assessment: The focus of the session was Strengthening, ROM, Stretching, Balance, Motor Control, Dynamic Stability Training, and functional training. The pt demonstrated Good tolerance to the noted exercises today. The pt is demonstrated Good progress in skilled rehab at this time. The pt is still limited in overall Strength, Motor control, Balance, and Pain at this time. The pt continues to be a good candidate for skilled PT, in order to further improve Strength, Motor control, Balance, and Pain.        Plan: Possible discharge next session.       Rivera Bragg, PT

## 2024-03-07 ENCOUNTER — TREATMENT (OUTPATIENT)
Dept: PHYSICAL THERAPY | Facility: CLINIC | Age: 74
End: 2024-03-07
Payer: MEDICARE

## 2024-03-07 DIAGNOSIS — Z47.89 ORTHOPEDIC AFTERCARE: Primary | ICD-10-CM

## 2024-03-07 DIAGNOSIS — M25.562 LEFT KNEE PAIN: ICD-10-CM

## 2024-03-07 PROCEDURE — 97016 VASOPNEUMATIC DEVICE THERAPY: CPT | Mod: GP

## 2024-03-07 PROCEDURE — 97110 THERAPEUTIC EXERCISES: CPT | Mod: GP

## 2024-03-07 NOTE — PROGRESS NOTES
Physical Therapy  Physical Therapy Treatment    Patient Name: Megha Vivsa  MRN: [id number]  Today's Date: 3/7/2024    Time Calculation  Start Time: 1445  Stop Time: 1540  Time Calculation (min): 55 min    Referred By: Alfred  Insurance: [employer] Medicare; $0 co-pay  Visit Limit: MN  Visit: 10      Current Problem  1. Orthopedic aftercare        2. Left knee pain             Pain: 0/10    Goals:   PT Goal 1       Start:  01/11/24    Expected End:  02/25/24       In 3 weeks, pt will rate pain 0-3 on the numeric pain scale to allow for restful nights of sleep. (100% met)  In 3 weeks, pt will be able to stand for 60 minutes.  (75% met)  In 3 weeks, pt will be able to walk for 60 minutes. (75% met)         PT Goal 2       Start:  01/11/24    Expected End:  04/10/24       In 6 weeks, pt will be able to reciprocally negotiate stairs to second floor without pain. (Ongoing, 75% met)  In 6 weeks, pt will be independent with HEP. (100% met)  In 6 weeks, pt's LEFS will be +8. (100% met)          Subjective:   Subjective   Patient reports that she has been doing okay but continues to have difficulty with end range knee extension and stair negotiation.     HEP Compliance: Good    Objective:   Objective   Observation: no outward signs of infection; incision appears well-healed    Gait: with front wheeled walker    4 Stage Balance Test  Close stance: 30 sec  Semi-Tandem: 30 sec B  Tandem  R: 30 sec  L: 20 sec  Single Leg  R: 12 sec  L: 11 sec    Knee AROM  Flexion  R: 4+  L: 4+  Extension  R: 4+  L: 4+    LE MMT   Knee Flexion  R: 140 deg  L: 120 deg  Knee Extension  R: 0 deg   L: -2 deg    Lower Extremity Functional Movements  Transfers: independent    Outcome Measures:  LEFS: 57 from 41    Treatments:     Exercise Sets/Reps   Recumbent bike 5'   Objective measures    Heel prop extension w/ 10# weight 5'   Cybex TKEs w/ 10#  3 x 15    2-1 leg press 60# 3 x 15   SL leg press 50# 3 x 15   3 inch lateral step downs  2 x 15   Standing  HS stretch 2 x 1'   Game ready L knee (mod compression) 15 min   Access Code: [email or web address]     Assessment: Upon re-examination, pt displayed improvements in gross lower extremity strength, balance, lower extremity flexibility, pain, functional mobility, and functional ability. However, pt is still having difficulty with gross lower extremity strength, balance, lower extremity flexibility, pain, knee range of motion, functional mobility, and functional ability. Pt has currently met 3/6 goals. Skilled physical therapy is still necessary in order to further improve aforementioned impairments to allow for increased participation in functional and recreational activities without limitations. Plan of care will consist of core, lower extremity stretching and strengthening in order to return to PLOF.        Plan: Continue to work to achieve terminal end range extension as well as improving functional ability to increase home safety and independence.        Rivera Bragg, PT

## 2024-03-12 ENCOUNTER — TREATMENT (OUTPATIENT)
Dept: PHYSICAL THERAPY | Facility: CLINIC | Age: 74
End: 2024-03-12
Payer: MEDICARE

## 2024-03-12 DIAGNOSIS — Z47.89 ORTHOPEDIC AFTERCARE: Primary | ICD-10-CM

## 2024-03-12 DIAGNOSIS — M25.562 LEFT KNEE PAIN: ICD-10-CM

## 2024-03-12 PROCEDURE — 97016 VASOPNEUMATIC DEVICE THERAPY: CPT | Mod: GP

## 2024-03-12 PROCEDURE — 97110 THERAPEUTIC EXERCISES: CPT | Mod: GP

## 2024-03-12 NOTE — PROGRESS NOTES
Physical Therapy  Physical Therapy Treatment    Patient Name: Megha Vivas  MRN: [id number]  Today's Date: 3/12/2024    Time Calculation  Start Time: 1130  Stop Time: 1225  Time Calculation (min): 55 min    Referred By: Alfred  Insurance: [employer] Medicare; $0 co-pay  Visit Limit: MN  Visit: 11    Current Problem  1. Orthopedic aftercare        2. Left knee pain                 Pain: 0/10       Subjective:   Subjective   Patient reports that she reached out to MD to discuss what her knee flexion range of motion should be, but he has yet to respond.    HEP Compliance: Good    Objective:   Objective   Gait: slightly decreased LLE terminal knee extension    Treatments:     Exercise Sets/Reps   Recumbent bike 5'   3 inch lateral step downs 3 x 15   3 inch frontal step downs 3 x 15    Low sit to stands 3 x 15    Mini RDLs w/ 15# kb 2 x 15   Modified dead lifts w/ 15# kb 2 x 15    Cybex 70# leg press 3 x 15    Cybex SL leg press 30# 3 x 15    Game ready L knee (mod compression) 15 min   Access Code: [email or web address]     Assessment: The focus of the session was Strengthening, Motor Control, and functional training. The pt demonstrated Good tolerance to the noted exercises today. The pt is demonstrated Good progress in skilled rehab at this time. The pt is still limited in overall Strength, Motor control, Balance, and Pain at this time. The pt continues to be a good candidate for skilled PT, in order to further improve Strength, ROM, Flexibility, Motor control, Balance, Gait mechanics, and Pain.          Plan: Possible discharge within two weeks.       Rivera Bragg, PT

## 2024-03-19 ENCOUNTER — TREATMENT (OUTPATIENT)
Dept: PHYSICAL THERAPY | Facility: CLINIC | Age: 74
End: 2024-03-19
Payer: MEDICARE

## 2024-03-19 DIAGNOSIS — Z47.89 ORTHOPEDIC AFTERCARE: ICD-10-CM

## 2024-03-19 DIAGNOSIS — M25.562 LEFT KNEE PAIN: ICD-10-CM

## 2024-03-19 PROCEDURE — 97110 THERAPEUTIC EXERCISES: CPT | Mod: GP

## 2024-03-19 PROCEDURE — 97016 VASOPNEUMATIC DEVICE THERAPY: CPT | Mod: GP

## 2024-03-19 NOTE — PROGRESS NOTES
Physical Therapy  Physical Therapy Treatment    Patient Name: Megah Vivas  MRN: [id number]  Today's Date: 3/19/2024    Time Calculation  Start Time: 1400  Stop Time: 1458  Time Calculation (min): 58 min    Referred By: Alfred  Insurance: [employer]; $0 co-pay  Visit Limit: MN  Visit: 12    Current Problem  1. Left knee pain  Follow Up In Physical Therapy      2. Orthopedic aftercare  Follow Up In Physical Therapy           Pain: 0/10     Subjective:   Subjective   Patient reports that she has been recently working on knee flexion range of motion.     HEP Compliance: Good    Objective:   Objective   LE AROM  Knee Flexion  R: 140 deg  L: 123 deg  Knee Extension  R: 0 deg   L: -2 deg    Treatments:     Exercise Sets/Reps   Nu step 5'   Heel propped into extension w/ 8# weight 5'   Supine HS stretch w/ strap 2 x 1'   SAQ w/ bolster and 7# ankle weight 2 x 15 w/ 3 sec holds   SLR w/ 3# ankle weight 2 x 15    SL bridges 2 x 15   Bridges w/ hip adduction 2 x 15    DL leg press 100# 3 x 15    Game ready L knee (mod compression) 15 min   Access Code: [email or web address]       Assessment: Continued to focus on improving end range knee flexion, extension range of motion within pain tolerance. Knee flexion range of motion slightly improved since last session. Continued to focus on improving quadriceps strength and hamstrings flexibility in order to achieve terminal knee extension range of motion. Also continued to improve functional strength to improve home safety and independence with stair negotiation. Pt was able to tolerate session with mild complaints of pain.        Plan: Discharge next session.        Rivera Bragg, PT

## 2024-03-28 ENCOUNTER — TREATMENT (OUTPATIENT)
Dept: PHYSICAL THERAPY | Facility: CLINIC | Age: 74
End: 2024-03-28
Payer: MEDICARE

## 2024-03-28 DIAGNOSIS — Z47.89 ORTHOPEDIC AFTERCARE: Primary | ICD-10-CM

## 2024-03-28 DIAGNOSIS — M25.562 LEFT KNEE PAIN: ICD-10-CM

## 2024-03-28 PROCEDURE — 97016 VASOPNEUMATIC DEVICE THERAPY: CPT | Mod: GP

## 2024-03-28 PROCEDURE — 97110 THERAPEUTIC EXERCISES: CPT | Mod: GP

## 2024-03-28 NOTE — PROGRESS NOTES
Physical Therapy  Physical Therapy Treatment    Patient Name: Megha Vivas  MRN: [id number]  Today's Date: 3/28/2024    Time Calculation  Start Time: 1615  Stop Time: 1655  Time Calculation (min): 40 min    Referred By: Alfred  Insurance: [employer]; $0 co-pay  Visit Limit: MN  Visit: 13    Current Problem  1. Orthopedic aftercare  Follow Up In Physical Therapy      2. Left knee pain  Follow Up In Physical Therapy           Pain: 0/10       Goals:   PT Goal 1       Start:  01/11/24    Expected End:  02/25/24       In 3 weeks, pt will rate pain 0-3 on the numeric pain scale to allow for restful nights of sleep. (100% met)  In 3 weeks, pt will be able to stand for 60 minutes.  (100% met)  In 3 weeks, pt will be able to walk for 60 minutes. (100% met)         PT Goal 2       Start:  01/11/24    Expected End:  04/10/24       In 6 weeks, pt will be able to reciprocally negotiate stairs to second floor without pain. (100% met)  In 6 weeks, pt will be independent with HEP. (100% met)  In 6 weeks, pt's LEFS will be +8. (100% met)        Subjective:   Subjective   Patient reports that she has been doing okay. Says that she is ready for today to be discharge.     HEP Compliance: Good    Objective:   Objective   Observation: no outward signs of infection; incision appears well-healed    Gait: normal gait pattern w/o device    4 Stage Balance Test  Close stance: 30 sec  Semi-Tandem: 30 sec B  Tandem  R: 30 sec  L: 15 sec  Single Leg  R: 5 sec  L: 5 sec    Knee MMT  Flexion  R: 4+  L: 4+  Extension  R: 4+  L: 4+    LE AROM  Knee Flexion  R: 140 deg  L: 122 deg  Knee Extension  R: 0 deg   L: 0 deg    Flexibility  Hamstrings: mod tight B  Quads: min tight R, mod tight L    Palpation: no tenderness noted     Circumferential Measurements  Mid-Patellar  R: 39 cm  L: 40 cm    Lower Extremity Functional Movements  Transfers: independent    Outcome Measures:  LEFS: 71 from 41    Treatments:     Exercise Sets/Reps   Nu step 5'    Objective measures    Cybex leg press 85# 3 x 15    Cybex HS curl machine 20# 2 x 15    HEP Printout    Game ready L knee (mod compression) 15 min   Education on rehab prognosis, plan of care, goals   Access Code: [email or web address]     Access Code: [email or web address]  URL: [email or web address]  Date: 03/28/2024  Prepared by: Rivera Bragg    Exercises  - Long Sitting Hamstring Stretch  - 2 x daily - 7 x weekly - 2 sets - 60 seconds hold  - Supine Heel Slide with Strap  - 2 x daily - 7 x weekly - 2 sets - 10 reps  - Seated Heel Slide  - 2 x daily - 7 x weekly - 2 sets - 10 reps  - Supine Active Straight Leg Raise  - 2 x daily - 7 x weekly - 2 sets - 10 reps  - Bridge with Heels on Swiss Ball  - 2 x daily - 7 x weekly - 2 sets - 10 reps  - Supine Bridge with Heels on Swiss Ball and Knees Bent  - 2 x daily - 7 x weekly - 2 sets - 10 reps  - Standing Quad Set  - 2 x daily - 7 x weekly - 2 sets - 100 reps - 3-5 seconds hold  - Seated Passive Knee Extension  - 2 x daily - 7 x weekly - 2 sets  - Supine Bridge  - 2 x daily - 7 x weekly - 2 sets - 10 reps  - Standing Hip Abduction with Counter Support  - 2 x daily - 7 x weekly - 2 sets - 10 reps  - Standing Hip Extension with Counter Support  - 2 x daily - 7 x weekly - 2 sets - 10 reps  - Mini Squat with Counter Support  - 2 x daily - 7 x weekly - 2 sets - 10 reps  - Sit to Stand Without Arm Support  - 2 x daily - 7 x weekly - 2 sets - 10 reps  - Forward Step Up with Counter Support  - 2 x daily - 7 x weekly - 2 sets - 10 reps  - Lateral Step Up with Counter Support  - 2 x daily - 7 x weekly - 2 sets - 10 reps  - Forward Step Down with Counter Support at Side  - 2 x daily - 7 x weekly - 2 sets - 10 rep      Assessment: Upon re-examination, pt displayed improvements in gross lower extremity strength, balance, pain, functional mobility, and functional ability. Pt has met 6/6 goals and is to be discharged home with HEP. Pt verbalizes understanding and agreement in  discharge plan. Encouraged pt to contact office if she has future questions, concerns.       Plan: Discharge home with HEP to continue independently.     Rivera Bragg, PT

## 2024-06-04 ENCOUNTER — HOSPITAL ENCOUNTER (OUTPATIENT)
Dept: RADIOLOGY | Facility: CLINIC | Age: 74
Discharge: HOME | End: 2024-06-04
Payer: MEDICARE

## 2024-06-04 ENCOUNTER — OFFICE VISIT (OUTPATIENT)
Dept: ORTHOPEDIC SURGERY | Facility: CLINIC | Age: 74
End: 2024-06-04
Payer: MEDICARE

## 2024-06-04 DIAGNOSIS — Z96.652 S/P TOTAL KNEE ARTHROPLASTY, LEFT: ICD-10-CM

## 2024-06-04 PROCEDURE — 99213 OFFICE O/P EST LOW 20 MIN: CPT | Performed by: STUDENT IN AN ORGANIZED HEALTH CARE EDUCATION/TRAINING PROGRAM

## 2024-06-04 PROCEDURE — 73562 X-RAY EXAM OF KNEE 3: CPT | Mod: LT

## 2024-06-04 PROCEDURE — 1159F MED LIST DOCD IN RCRD: CPT | Performed by: STUDENT IN AN ORGANIZED HEALTH CARE EDUCATION/TRAINING PROGRAM

## 2024-06-04 PROCEDURE — 73562 X-RAY EXAM OF KNEE 3: CPT | Mod: LEFT SIDE | Performed by: RADIOLOGY

## 2024-06-04 NOTE — PROGRESS NOTES
OMEGA Lewis, PA-C  Division of Adult Reconstruction  Phone: [phone]  Fax: [phone]        Chief Complaint   Patient presents with    Left Knee - Pain       HPI:  Diagnosis: End stage osteoarthritis left Knee  Procedure Performed: left Total Knee Arthroplasty   Date of Surgery: December 15, 2023    Megha Vivas is a pleasant 73 y.o. year-old female here for follow-up of their left total knee arthroplasty by Dr. Simon. The patient is approximately 6 month(s) postop. She expressed a concern with her range of motion. Pre-op her flexion was 137 degrees, in office today it was 122 degrees. She was hoping that she would obtain the same degree of flexion as she had pre-op. She reports that her current degree of flexion does not inhibit her ADLs as she is able to do the things she would like without pain or stiffness. The patient has mild mechanical symptoms. She does report that she feels like it may buckle when walking long distances. The patient has no swelling and no pain. The patient is not taking anything for pain. She has completed outpatient PT. She reports that she has continued doing heel slides and some of the ROM exercises. The patient is ambulatory without an assistive device. The patient has had no fall or trauma.  The patient denies: fevers, chills, chest or calf pain, shortness of breath.    There has been no interval change in this patient's past medical, surgical, medications, allergies, family history or social history since the most recent visit to a provider within our department. 14 point review of systems was performed, reviewed, and negative except for pertinent positives documented in the history of present illness.    Past Medical History:   Diagnosis Date    Cataract     Hard to intubate     Hypertension     Vertigo      Patient Active Problem List   Diagnosis    Arthritis of knee, left    Primary osteoarthritis of left knee    Orthopedic aftercare    Left knee  pain     Medication Documentation Review Audit       Reviewed by Fabian Cutler MA (Medical Assistant) on 24 at 1352      Medication Order Taking? Sig Documenting Provider Last Dose Status   cholecalciferol (Vitamin D-3) 50 MCG (2000) tablet [id number] No Take 1 tablet by mouth once daily. Demetrio Dang MD Past Week Active   fluticasone (Flonase) 50 mcg/actuation nasal spray [id number] No Administer 1 spray into each nostril once daily as needed for rhinitis. Shake gently. Before first use, prime pump. After use, clean tip and replace cap. Demetrio Dang MD 12/15/2023 Active   ibuprofen 200 mg tablet [id number] No Take 2 tablets by mouth every 6 hours if needed for mild pain (1 - 3). Demetrio Provider, MD Past Week Active   loratadine (Claritin) 10 mg tablet [id number] No Take 1 tablet by mouth once daily as needed for allergies. Demetrio Dang MD 12/15/2023 Active   losartan-hydrochlorothiazide (Hyzaar) 50-12.5 mg tablet [id number] No Take 1 tablet by mouth once daily. Demetrio Provider, MD 2023 Active   methylsulfonylmethane (MSM ORAL) [id number] No Take 1 capsule by mouth once daily. Historical Provider, MD Past Week Active   multivitamin tablet [id number] No Take 1 tablet by mouth once daily. Demetrio Provider, MD Past Week Active   ondansetron (Zofran) 4 mg tablet [id number]  Take 1 tablet (4 mg) by mouth every 8 hours if needed for nausea or vomiting. Ernie Simon MD  Active   pantoprazole (ProtoNix) 40 mg EC tablet [id number]  Take 1 tablet (40 mg) by mouth once daily in the morning. Take before meals. Do not crush, chew, or split. Ernie Simon MD   24 [id number]   TUMERIC-GING-OLIVE-OREG-CAPRYL ORAL [id number] No Take 1 tablet by mouth once daily. Historical Provider, MD Past Week Active                  Allergies   Allergen Reactions    Nickel Itching and Swelling    Pollen Extracts Cough and Runny nose     Social History     Socioeconomic History     Marital status:      Spouse name: Not on file    Number of children: Not on file    Years of education: Not on file    Highest education level: Not on file   Occupational History    Not on file   Tobacco Use    Smoking status: Never    Smokeless tobacco: Never   Vaping Use    Vaping status: Never Used   Substance and Sexual Activity    Alcohol use: Yes     Alcohol/week: 7.0 standard drinks of alcohol     Types: 7 Glasses of wine per week    Drug use: Yes     Types: Marijuana     Comment: vapes weekly    Sexual activity: Defer   Other Topics Concern    Not on file   Social History Narrative    Not on file     Social Determinants of Health     Financial Resource Strain: Low Risk  (1/29/2024)    Received from [employer]    Overall Financial Resource Strain (CARDIA)     Difficulty of Paying Living Expenses: Not very hard   Food Insecurity: No Food Insecurity (1/29/2024)    Received from [employer]    Hunger Vital Sign     Worried About Running Out of Food in the Last Year: Never true     Ran Out of Food in the Last Year: Never true   Transportation Needs: No Transportation Needs (1/29/2024)    Received from [employer]    PRAPARE - Transportation     Lack of Transportation (Medical): No     Lack of Transportation (Non-Medical): No   Physical Activity: Sufficiently Active (1/29/2024)    Received from [employer]    Exercise Vital Sign     Days of Exercise per Week: 5 days     Minutes of Exercise per Session: 30 min   Stress: No Stress Concern Present (1/29/2024)    Received from [employer]    [place] [place] [employer] - Occupational Stress Questionnaire     Feeling of Stress : Only a little   Social Connections: Socially Integrated (1/29/2024)    Received from [employer]    Social Connection and Isolation Panel [NHANES]     Frequency of Communication with Friends and Family: More than three times a week     Frequency of Social Gatherings with Friends and Family: Once a week     Attends [employer]  Services: 1 to 4 times per year     Active Member of Clubs or Organizations: Yes     Attends Club or Organization Meetings: More than 4 times per year     Marital Status:    Intimate Partner Violence: Not At Risk (1/29/2024)    Received from [employer]    Humiliation, Afraid, Rape, and Kick questionnaire     Fear of Current or Ex-Partner: No     Emotionally Abused: No     Physically Abused: No     Sexually Abused: No   Housing Stability: Low Risk  (1/29/2024)    Received from [employer] [facility]    Housing Stability Vital Sign     Unable to Pay for Housing in the Last Year: No     Number of Places Lived in the Last Year: 1     Unstable Housing in the Last Year: No     Past Surgical History:   Procedure Laterality Date    CATARACT EXTRACTION Left     HYSTERECTOMY      OTHER SURGICAL HISTORY      left victrectomy    OTHER SURGICAL HISTORY      bilateral breast reduction       Physical Exam:  General: Well-appearing female is alert and oriented x 3 and appears comfortable. NAD. Pleasant and cooperative.  Mood: Euthymic  Respirations: non-labored  Gait: Slight Antalgic  Assistive Device: no device. Coordination and balance intact.    left Knee  No other overlying lesion  Wound: Incision is  well healed , midline with no signs of surrounding infection, erythema, fluctuance, dehiscence, drainage, or suture abscess.   Range of motion: 0 degrees extension, 122 degrees flexion  Stable to varus and valgus stress at 0 and 30 degrees.   Patella tracks midline.  Calf: No swelling or tenderness. Lower extremity warm and well perfused.  Able to dorsi-flex and plantar-flex the ankle with appropriate strength. Able to wiggle all toes.   The foot is warm and well perfused with palpable pulses.   Sensation is intact to light touch.   Pulses: Palpable DP pulse    Imaging:  Radiographs of the operative knee were independently obtained and reviewed in clinic. They demonstrate the implant is well fixed and well positioned. No radiographic  evidence of edward-implant fracture, lucency or dislocation. Patella is tracking centrally. Compared to immediate post-operative x-rays, no change in appearance.     Impression/Plan:  Megha Vivas is doing well and progressing well approximately 6 month(s) s/p left total knee arthroplasty. I am satisfied with the patient's recovery to this point. Her range of motion is appropriate at this time and she was educated that the way the implant is positioned in her reconstructed knee will not allow her to achieve the degree of flexion she had pre-op. Hher degree of flexion is optimal at this time. I believe that her feeling of instability and weakness when walking long distances is due to muscular weakness. I instructed her to focus on a training regimen that focuses on strengthening her quads, glutes, and hamstrings. This will help to stabilize and support the knee when it is fatigued walking long distances. I demonstrated exercises today in office and explained the machines she can use in a gym.    A thorough discussion was had with the patient concerning the postoperative course and the patient is in agreement with the plan.  Continued physical therapy and strength training in the gym to help improve strength and stamina. Please avoid running, jumping, and heavy lifting. When kneeling on one should put a pillow, pad, or blanket under the knee when kneeling.  Reviewed the need for prophylactic antibiotics prior to any dental or other invasive procedures. Patient understands that their dentist or myself may prescribe.  Follow-up in 6 months without radiographs or sooner if there are any concerns.     All questions answered.    Follow-up 6 months with x-rays at next visit.    OMEGA Traylor, PADanitaC  Orthopedic Physician Assisant  Division of Adult Reconstruction  Department of Orthopaedics  [place] [address]

## 2024-07-11 DIAGNOSIS — Z47.1 AFTERCARE FOLLOWING JOINT REPLACEMENT SURGERY, UNSPECIFIED JOINT: ICD-10-CM

## 2024-07-11 RX ORDER — AMOXICILLIN 500 MG/1
CAPSULE ORAL
Qty: 4 CAPSULE | Refills: 6 | Status: SHIPPED | OUTPATIENT
Start: 2024-07-11

## 2024-11-20 ENCOUNTER — APPOINTMENT (OUTPATIENT)
Dept: ORTHOPEDIC SURGERY | Facility: CLINIC | Age: 74
End: 2024-11-20
Payer: MEDICARE

## 2024-12-10 ENCOUNTER — OFFICE VISIT (OUTPATIENT)
Dept: ORTHOPEDIC SURGERY | Facility: CLINIC | Age: 74
End: 2024-12-10
Payer: MEDICARE

## 2024-12-10 ENCOUNTER — HOSPITAL ENCOUNTER (OUTPATIENT)
Dept: RADIOLOGY | Facility: CLINIC | Age: 74
Discharge: HOME | End: 2024-12-10
Payer: MEDICARE

## 2024-12-10 DIAGNOSIS — M17.10 PRIMARY OSTEOARTHRITIS OF KNEE, UNSPECIFIED LATERALITY: ICD-10-CM

## 2024-12-10 DIAGNOSIS — Z96.652 S/P TOTAL KNEE ARTHROPLASTY, LEFT: ICD-10-CM

## 2024-12-10 PROCEDURE — 99213 OFFICE O/P EST LOW 20 MIN: CPT | Performed by: STUDENT IN AN ORGANIZED HEALTH CARE EDUCATION/TRAINING PROGRAM

## 2024-12-10 PROCEDURE — 77073 BONE LENGTH STUDIES: CPT | Performed by: RADIOLOGY

## 2024-12-10 PROCEDURE — 73562 X-RAY EXAM OF KNEE 3: CPT | Mod: LEFT SIDE | Performed by: RADIOLOGY

## 2024-12-10 PROCEDURE — 73562 X-RAY EXAM OF KNEE 3: CPT | Mod: LT

## 2024-12-10 PROCEDURE — G2211 COMPLEX E/M VISIT ADD ON: HCPCS | Performed by: STUDENT IN AN ORGANIZED HEALTH CARE EDUCATION/TRAINING PROGRAM

## 2024-12-10 PROCEDURE — 1159F MED LIST DOCD IN RCRD: CPT | Performed by: STUDENT IN AN ORGANIZED HEALTH CARE EDUCATION/TRAINING PROGRAM

## 2024-12-10 PROCEDURE — 77073 BONE LENGTH STUDIES: CPT

## 2024-12-10 NOTE — PROGRESS NOTES
Chief Complaint   Patient presents with    Left Knee - Follow-up       HPI:  Diagnosis: End stage osteoarthritis left Knee  Procedure Performed: left Total Knee Arthroplasty   Date of Surgery: December 15, 2023    Megha Vivas is a pleasant 74 y.o. year-old female here for follow-up of their left total knee arthroplasty by Dr. Simon. The patient is approximately 12 month(s) postop. Overall patient reports she has been doing well. She has some concerns regarding her limited flexion which was 137 degrees preop and 122 degrees at her last postoperative visit   06/04/24. She continues to do her aerobics and walking for exercises. She denies any pain in the knee. She complained of some instability and mechanical symptoms at her last visit which have since resolved with additional therapy and strengthening. She ambulates without assistive devices. Denies trauma, falls, fever, chills, numbness or tingling.     There has been no interval change in this patient's past medical, surgical, medications, allergies, family history or social history since the most recent visit to a provider within our department. 14 point review of systems was performed, reviewed, and negative except for pertinent positives documented in the history of present illness.    Past Medical History:   Diagnosis Date    Cataract     Hard to intubate     Hypertension     Vertigo      Patient Active Problem List   Diagnosis    Arthritis of knee, left    Primary osteoarthritis of left knee    Orthopedic aftercare    Left knee pain     Medication Documentation Review Audit       Reviewed by Yuki Huertas MA (Medical Assistant) on 12/10/24 at 1340      Medication Order Taking? Sig Documenting Provider Last Dose Status   amoxicillin (Amoxil) 500 mg capsule [id number]  Take 4 Tablets 1 Hour Prior to Dental Procedure or Cleaning. Kait Stanley PA-C  Active   cholecalciferol (Vitamin D-3) 50 MCG (2000 UT) tablet [id number] No Take 1 tablet by  mouth once daily. Demetrio Dang MD Past Week Active   fluticasone (Flonase) 50 mcg/actuation nasal spray [id number] No Administer 1 spray into each nostril once daily as needed for rhinitis. Shake gently. Before first use, prime pump. After use, clean tip and replace cap. Demetrio Dang MD 12/15/2023 Active   ibuprofen 200 mg tablet [id number] No Take 2 tablets by mouth every 6 hours if needed for mild pain (1 - 3). Demetrio Dang MD Past Week Active   loratadine (Claritin) 10 mg tablet [id number] No Take 1 tablet by mouth once daily as needed for allergies. Demetrio Dang MD 12/15/2023 Active   losartan-hydrochlorothiazide (Hyzaar) 50-12.5 mg tablet [id number] No Take 1 tablet by mouth once daily. Demetrio Dang MD 2023 Active   methylsulfonylmethane (MSM ORAL) [id number] No Take 1 capsule by mouth once daily. Demetrio Dang MD Past Week Active   multivitamin tablet [id number] No Take 1 tablet by mouth once daily. Demetrio Dang MD Past Week Active   ondansetron (Zofran) 4 mg tablet [id number]  Take 1 tablet (4 mg) by mouth every 8 hours if needed for nausea or vomiting. Ernie Simon MD  Active   pantoprazole (ProtoNix) 40 mg EC tablet [id number]  Take 1 tablet (40 mg) by mouth once daily in the morning. Take before meals. Do not crush, chew, or split. Ernie Simon MD   24 [id number]   TUMERIC-GING-OLIVE-OREG-CAPRYL ORAL [id number] No Take 1 tablet by mouth once daily. Demetrio Dang MD Past Week Active                  Allergies   Allergen Reactions    Nickel Itching and Swelling    Pollen Extracts Cough and Runny nose     Social History     Socioeconomic History    Marital status:      Spouse name: Not on file    Number of children: Not on file    Years of education: Not on file    Highest education level: Not on file   Occupational History    Not on file   Tobacco Use    Smoking status: Never    Smokeless tobacco: Never   Vaping Use     Vaping status: Never Used   Substance and Sexual Activity    Alcohol use: Yes     Alcohol/week: 7.0 standard drinks of alcohol     Types: 7 Glasses of wine per week    Drug use: Yes     Types: Marijuana     Comment: vapes weekly    Sexual activity: Defer   Other Topics Concern    Not on file   Social History Narrative    Not on file     Social Drivers of Health     Financial Resource Strain: Low Risk  (1/29/2024)    Received from [employer]    Overall Financial Resource Strain (CARDIA)     Difficulty of Paying Living Expenses: Not very hard   Food Insecurity: No Food Insecurity (1/29/2024)    Received from [employer]    Hunger Vital Sign     Worried About Running Out of Food in the Last Year: Never true     Ran Out of Food in the Last Year: Never true   Transportation Needs: No Transportation Needs (1/29/2024)    Received from [employer]    PRAPARE - Transportation     Lack of Transportation (Medical): No     Lack of Transportation (Non-Medical): No   Physical Activity: Sufficiently Active (1/29/2024)    Received from [employer]    Exercise Vital Sign     Days of Exercise per Week: 5 days     Minutes of Exercise per Session: 30 min   Stress: No Stress Concern Present (1/29/2024)    Received from [employer]    [employer] [employer] - Occupational Stress Questionnaire     Feeling of Stress : Only a little   Social Connections: Socially Integrated (1/29/2024)    Received from [employer]    Social Connection and Isolation Panel [NHANES]     Frequency of Communication with Friends and Family: More than three times a week     Frequency of Social Gatherings with Friends and Family: Once a week     Attends Christianity Services: 1 to 4 times per year     Active Member of Clubs or Organizations: Yes     Attends Club or Organization Meetings: More than 4 times per year     Marital Status:    Intimate Partner Violence:  Not At Risk (1/29/2024)    Received from [employer]    Humiliation, Afraid, Rape, and Kick questionnaire     Fear of Current or Ex-Partner: No     Emotionally Abused: No     Physically Abused: No     Sexually Abused: No   Housing Stability: Low Risk  (1/29/2024)    Received from [employer]    Housing Stability Vital Sign     Unable to Pay for Housing in the Last Year: No     Number of Places Lived in the Last Year: 1     Unstable Housing in the Last Year: No     Past Surgical History:   Procedure Laterality Date    CATARACT EXTRACTION Left     HYSTERECTOMY      OTHER SURGICAL HISTORY      left victrectomy    OTHER SURGICAL HISTORY      bilateral breast reduction       Physical Exam:  General: Well-appearing female is alert and oriented x 3 and appears comfortable. NAD. Pleasant and cooperative.  Mood: Euthymic  Respirations: non-labored  Gait: Slight Antalgic  Assistive Device: no device. Coordination and balance intact.    left Knee  No other overlying lesion  Wound: Incision is  well healed , midline with no signs of surrounding infection, erythema, fluctuance, dehiscence, drainage, or suture abscess.   Range of motion: 0 degrees extension, 125 degrees flexion   Stable to varus and valgus stress at 0 and 30 degrees.   Patella tracks midline.  Calf: No swelling or tenderness. Lower extremity warm and well perfused.  Able to dorsi-flex and plantar-flex the ankle with appropriate strength. Able to wiggle all toes.   The foot is warm and well perfused with palpable pulses.   Sensation is intact to light touch.   Pulses: Palpable DP pulse    Imaging:  Radiographs of the operative knee were independently obtained and reviewed in clinic. They demonstrate the implant is well fixed and well positioned. No radiographic evidence of edward-implant fracture, lucency or dislocation. Patella is tracking centrally. Compared to immediate post-operative x-rays, no change in appearance.      Impression/Plan:  Megha Vivas is doing well and progressing well approximately 12 month(s) s/p left total knee arthroplasty. She is doing well. Her flexion is somewhat improved and is optimal at this time.   A thorough discussion was had with the patient concerning the postoperative course and the patient is in agreement with the plan.  When kneeling on one should put a pillow, pad, or blanket under the knee when kneeling.  Reviewed the need for prophylactic antibiotics prior to any dental or other invasive procedures for another year. Patient understands that their dentist or myself may prescribe.    All questions answered.    Follow-up 2 years with repeat x-rays of the left knee

## 2024-12-17 ENCOUNTER — OFFICE VISIT (OUTPATIENT)
Dept: ORTHOPEDIC SURGERY | Facility: CLINIC | Age: 74
End: 2024-12-17
Payer: MEDICARE

## 2024-12-17 ENCOUNTER — HOSPITAL ENCOUNTER (OUTPATIENT)
Dept: RADIOLOGY | Facility: CLINIC | Age: 74
Discharge: HOME | End: 2024-12-17
Payer: MEDICARE

## 2024-12-17 DIAGNOSIS — M75.101 TEAR OF RIGHT ROTATOR CUFF, UNSPECIFIED TEAR EXTENT, UNSPECIFIED WHETHER TRAUMATIC: ICD-10-CM

## 2024-12-17 DIAGNOSIS — M25.511 RIGHT SHOULDER PAIN, UNSPECIFIED CHRONICITY: ICD-10-CM

## 2024-12-17 PROCEDURE — 99204 OFFICE O/P NEW MOD 45 MIN: CPT | Performed by: ORTHOPAEDIC SURGERY

## 2024-12-17 PROCEDURE — 1036F TOBACCO NON-USER: CPT | Performed by: ORTHOPAEDIC SURGERY

## 2024-12-17 PROCEDURE — 2500000004 HC RX 250 GENERAL PHARMACY W/ HCPCS (ALT 636 FOR OP/ED): Performed by: ORTHOPAEDIC SURGERY

## 2024-12-17 PROCEDURE — 1160F RVW MEDS BY RX/DR IN RCRD: CPT | Performed by: ORTHOPAEDIC SURGERY

## 2024-12-17 PROCEDURE — 1159F MED LIST DOCD IN RCRD: CPT | Performed by: ORTHOPAEDIC SURGERY

## 2024-12-17 PROCEDURE — 20610 DRAIN/INJ JOINT/BURSA W/O US: CPT | Mod: RT | Performed by: ORTHOPAEDIC SURGERY

## 2024-12-17 PROCEDURE — 73030 X-RAY EXAM OF SHOULDER: CPT | Mod: RIGHT SIDE | Performed by: RADIOLOGY

## 2024-12-17 PROCEDURE — 99214 OFFICE O/P EST MOD 30 MIN: CPT | Mod: 25 | Performed by: ORTHOPAEDIC SURGERY

## 2024-12-17 PROCEDURE — 73030 X-RAY EXAM OF SHOULDER: CPT | Mod: RT

## 2024-12-17 RX ORDER — LIDOCAINE HYDROCHLORIDE 20 MG/ML
2 INJECTION, SOLUTION INFILTRATION; PERINEURAL
Status: COMPLETED | OUTPATIENT
Start: 2024-12-17 | End: 2024-12-17

## 2024-12-17 RX ORDER — METHYLPREDNISOLONE ACETATE 40 MG/ML
40 INJECTION, SUSPENSION INTRA-ARTICULAR; INTRALESIONAL; INTRAMUSCULAR; SOFT TISSUE
Status: COMPLETED | OUTPATIENT
Start: 2024-12-17 | End: 2024-12-17

## 2024-12-17 NOTE — PROGRESS NOTES
Chief Complaint   Chief Complaint   Patient presents with    Right Shoulder - Pain         HPI:      Megha Vivas is a pleasant 74 y.o. 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    There were no vitals filed for this visit.    Past Medical History:   Diagnosis Date    Cataract     Hard to intubate     Hypertension     Vertigo      Patient Active Problem List   Diagnosis    Arthritis of knee, left    Primary osteoarthritis of left knee    Orthopedic aftercare    Left knee pain       Medication Documentation Review Audit       Reviewed by Debi Cutler MA (Medical Assistant) on 12/17/24 at 1334      Medication Order Taking? Sig Documenting Provider Last Dose Status   amoxicillin (Amoxil) 500 mg capsule [id number]  Take 4 Tablets 1 Hour Prior to Dental Procedure or Cleaning. Kait Stanley PA-C  Active   cholecalciferol (Vitamin D-3) 50 MCG (2000 UT) tablet [id number] No Take 1 tablet by mouth once daily. Historical Provider, MD Past Week Active   fluticasone (Flonase) 50 mcg/actuation nasal spray [id number] No Administer 1 spray into each nostril once daily as needed for rhinitis. Shake gently. Before first use, prime pump. After use, clean tip and replace cap. Demetrio Provider, MD 12/15/2023 Active   ibuprofen 200 mg tablet [id number] No Take 2 tablets by mouth every 6 hours if needed for mild pain (1 - 3). Historical Provider, MD Past Week Active   loratadine (Claritin) 10 mg tablet [id number] No Take 1 tablet by mouth once daily as needed for allergies. Demetrio Dang MD 12/15/2023 Active   losartan-hydrochlorothiazide (Hyzaar) 50-12.5 mg tablet [id number] No Take 1 tablet by mouth once daily. Demetrio Provider, MD 12/14/2023 Active   methylsulfonylmethane (MSM ORAL)  [id number] No Take 1 capsule by mouth once daily. Historical Provider, MD Past Week Active   multivitamin tablet [id number] No Take 1 tablet by mouth once daily. Historical Provider, MD Past Week Active   ondansetron (Zofran) 4 mg tablet [id number]  Take 1 tablet (4 mg) by mouth every 8 hours if needed for nausea or vomiting. Ernie Simon MD  Active   pantoprazole (ProtoNix) 40 mg EC tablet [id number]  Take 1 tablet (40 mg) by mouth once daily in the morning. Take before meals. Do not crush, chew, or split. Ernie Simon MD   24 2359   TUMERIC-GING-OLIVE-OREG-CAPRYL ORAL [id number] No Take 1 tablet by mouth once daily. Historical Provider, MD Past Week Active                    Allergies   Allergen Reactions    Nickel Itching and Swelling    Pollen Extracts Cough and Runny nose       Social History     Socioeconomic History    Marital status:      Spouse name: Not on file    Number of children: Not on file    Years of education: Not on file    Highest education level: Not on file   Occupational History    Not on file   Tobacco Use    Smoking status: Never    Smokeless tobacco: Never   Vaping Use    Vaping status: Never Used   Substance and Sexual Activity    Alcohol use: Yes     Alcohol/week: 7.0 standard drinks of alcohol     Types: 7 Glasses of wine per week    Drug use: Yes     Types: Marijuana     Comment: vapes weekly    Sexual activity: Defer   Other Topics Concern    Not on file   Social History Narrative    Not on file     Social Drivers of Health     Financial Resource Strain: Low Risk  (2024)    Received from [facility]    Overall Financial Resource Strain (CARDIA)     Difficulty of Paying Living Expenses: Not very hard   Food Insecurity: No Food Insecurity (2024)    Received from [facility]    Hunger Vital Sign     Worried About Running Out of Food in the Last Year: Never true     Ran Out of Food in the Last Year: Never true    Transportation Needs: No Transportation Needs (1/29/2024)    Received from [facility]    PRAPARE - Transportation     Lack of Transportation (Medical): No     Lack of Transportation (Non-Medical): No   Physical Activity: Sufficiently Active (1/29/2024)    Received from [facility]    Exercise Vital Sign     Days of Exercise per Week: 5 days     Minutes of Exercise per Session: 30 min   Stress: No Stress Concern Present (1/29/2024)    Received from [facility]    [place] [employer] - Occupational Stress Questionnaire     Feeling of Stress : Only a little   Social Connections: Socially Integrated (1/29/2024)    Received from [facility]    Social Connection and Isolation Panel [NHANES]     Frequency of Communication with Friends and Family: More than three times a week     Frequency of Social Gatherings with Friends and Family: Once a week     Attends Congregation Services: 1 to 4 times per year     Active Member of Clubs or Organizations: Yes     Attends Club or Organization Meetings: More than 4 times per year     Marital Status:    Intimate Partner Violence: Not At Risk (1/29/2024)    Received from [facility]    Humiliation, Afraid, Rape, and Kick questionnaire     Fear of Current or Ex-Partner: No     Emotionally Abused: No     Physically Abused: No     Sexually Abused: No   Housing Stability: Low Risk  (1/29/2024)    Received from [facility]    Housing Stability Vital Sign     Unable to Pay for Housing in the Last Year: No     Number of Places Lived in the Last Year: 1     Unstable Housing in the Last Year: No       Past Surgical History:   Procedure Laterality Date    CATARACT EXTRACTION Left     HYSTERECTOMY      OTHER SURGICAL HISTORY      left victrectomy    OTHER SURGICAL HISTORY      bilateral breast reduction       There is no height or weight on file to calculate BMI.    HgA1c:   No results found  "for: \"HGBA1C\", \"IFETMHBD0B\"    Physical Exam:  Constitutional: Well-developed well-nourished   Eyes: Sclerae anicteric, pupils equal and round  HENT: Normocephalic atraumatic  Cardiovascular: Pulses full, regular rate and rhythm  Respiratory: Breathing not labored, no wheezing  Integumentary: Skin intact, no lesions or rashes  Neurological: Sensation intact, no gross strength deficits, reflexes equal  Psychiatric: Alert oriented and appropriate  Hematologic/lymphatic: No lymphadenopathy  Right shoulder: No mass swelling erythema no deformity range of motion equivalent to contralateral side has a positive painful arc sign she is tender in the bicipital groove          Imaging:  X-rays of the right shoulder reviewed and are essentially negative        Impression/Plan:  Cuff syndrome  Injected shoulder physical therapy referral reevaluate 4 to 6 weeks  L Inj/Asp: R subacromial bursa on 12/17/2024 1:44 PM  Indications: pain  Details: 21 G needle, lateral approach  Medications: 40 mg methylPREDNISolone acetate 40 mg/mL; 2 mL lidocaine 20 mg/mL (2 %)          "

## 2025-01-15 ENCOUNTER — EVALUATION (OUTPATIENT)
Dept: PHYSICAL THERAPY | Facility: CLINIC | Age: 75
End: 2025-01-15
Payer: MEDICARE

## 2025-01-15 DIAGNOSIS — M25.511 RIGHT SHOULDER PAIN, UNSPECIFIED CHRONICITY: Primary | ICD-10-CM

## 2025-01-15 DIAGNOSIS — M75.101 TEAR OF RIGHT ROTATOR CUFF, UNSPECIFIED TEAR EXTENT, UNSPECIFIED WHETHER TRAUMATIC: ICD-10-CM

## 2025-01-15 PROCEDURE — 97161 PT EVAL LOW COMPLEX 20 MIN: CPT | Mod: GP | Performed by: PHYSICAL THERAPIST

## 2025-01-15 PROCEDURE — 97110 THERAPEUTIC EXERCISES: CPT | Mod: GP | Performed by: PHYSICAL THERAPIST

## 2025-01-15 ASSESSMENT — ENCOUNTER SYMPTOMS
OCCASIONAL FEELINGS OF UNSTEADINESS: 0
DEPRESSION: 0
LOSS OF SENSATION IN FEET: 0

## 2025-01-15 NOTE — PROGRESS NOTES
Physical Therapy    Physical Therapy Evaluation and Treatment      Patient Name: Megha Vivas  MRN: [id number]  Today's Date: 1/15/2025  Time Entry:   Time Calculation  Start Time: 0945  Stop Time: 1030  Time Calculation (min): 45 min  PT Evaluation Time Entry  PT Evaluation (Low) Time Entry: 30  PT Therapeutic Procedures Time Entry  Therapeutic Exercise Time Entry: 12     Insurance: [employer] [place], [place], no auth  Visit #1    Assessment:  Megha is a 74 y.o. R hand dominant female presenting with R shoulder pain ongoing since September without incident. Exam shows decreased and painful shoulder AROM and MMT. Signs/symptoms are consistent with possible rotator cuff pathology. She would benefit from skilled PT to address the listed impairments to restore functional use of dominant arm for reaching, lifting, carrying and other ADLs.    Plan:  OP PT Plan  Treatment/Interventions: Therapeutic exercises, Manual therapy, Dry needling, Electrical stimulation, Cryotherapy, Hot pack, Education/ Instruction  PT Plan: Skilled PT  PT Frequency: 1 time per week  Duration: 6 weeks  Onset Date: 09/01/24  Certification Period Start Date: 01/15/25  Certification Period End Date: 04/15/25  [place] of Treatments Authorized: [place]  Rehab Potential: Good  Plan of Care Agreement: Patient      Problem List Items Addressed This Visit             ICD-10-CM    Right shoulder pain - Primary M25.511    Relevant Orders    Follow Up In Physical Therapy     Other Visit Diagnoses         Codes    Tear of right rotator cuff, unspecified tear extent, unspecified whether traumatic     M75.101    Relevant Orders    Follow Up In Physical Therapy            General:  Reason for Referral: R shoulder pain  Referred By: Omi Dillon MD  Preferred Learning Style: verbal, visual, written    Subjective    Megha c/o R shoulder pain since September without specific incident. She had L TKA in December 2023 and feels she had been using her arms more which may have  contributed to her pain. She states one day she was unable to lift her arm. She saw ortho, had x-rays which showed mild acromioclavicular OA and was told she may have a torn rotator cuff. She had an injection which alleviated most of her pain and has allowed her to sleep at night now which she couldn't do before. She is taking Tylenol or ibuprofen when pain is bad. Pain is still typically worst at night. She tried doing some exercises she found online but thought it was making pain worse. She has difficulty lifting weighted objects, washing her back, vacuuming, carrying laundry. She states feeling her shoulder feels weak. She denies any associated neck pain or consistent radiating pain or n/t. PMHx: HTN, OA, L TKA in Dec 2023    Pt Goals: “Avoid any kind of surgery.”    Precautions:  Precautions  FAHEEMADI Fall Risk Score (The score of 4 or more indicates an increased risk of falling): 0    Pain:  R shoulder 0/10 currently, 8/10 at night, ache in nature    Prior Level of Function:  Independent in ADLs. Retired.    Objective   Palpation: (-) TTP to R shoulder structures at rest    Observation: forward head, rounded shoulders, B scapular protraction    Shoulder AROM (R/L in degrees):   Flexion- 155 / 160  Abduction- 145 *painful / 160  Extension- 40 *painful / 60  Functional ER reach- T3 / T3  Functional IR reach- T10 *painful / T5    Shoulder MMT (R/L):   Flexion- 4+/5; 5/5  Abduction- 3+/5; 5/5  ER- 4-/5; 4+/5  IR- 5/5; 5/5  Serratus anterior- 3/5; 3+/5    Special tests:  Impingement cluster:  (+) Coleman/Micky  (+) Painful arc  (+) Infraspinatus MMT    Rotator cuff tear cluster:  (-) Drop arm   (+) Painful arc  (+) Infraspinatus MMT    Outcome Measures:  Quick DASH: 39 = 63.64%    Treatments:  Therapeutic Exercise:   Shoulder isometrics: ABD, ER x10 w/ 10 sec hold ea R  Rows blue tube x20  Serratus wall slides x10    EDUCATION:  Access Code: [email or web address]  URL: [email or web address]  Date:  01/15/2025  Prepared by: Hernan Talavera    Exercises  - Standing Isometric Shoulder Abduction with Doorway - Arm Bent  - 1-2 x daily - 7 x weekly - 2 sets - 10 reps - 10 seconds hold  - Standing Isometric Shoulder External Rotation with Doorway and Towel Roll  - 1-2 x daily - 7 x weekly - 2 sets - 10 reps - 10 seconds hold  - Standing Row with Anchored Resistance  - 1-2 x daily - 7 x weekly - 2 sets - 20 reps  - Serratus Activation at Wall  - 1-2 x daily - 7 x weekly - 2 sets - 10 reps    Goals:  Active       PT Problem       PT Goal 1       Start:  01/15/25    Expected End:  03/16/25       Increase R shoulder AROM to 160 degrees flexion and abduction, extension to 60 degrees and functional IR reach to at least T7 to improve reaching overhead, behind head and behind back for ADLs.         PT Goal 2       Start:  01/15/25    Expected End:  03/16/25       Increase R shoulder MMT to 5/5 in flexion and at least 4+/5 in ER and abduction to improve lifting and carrying weighted objects.         PT Goal 3       Start:  01/15/25    Expected End:  03/16/25       Pt will report at least 75% decrease in R shoulder pain to improve bathing, reaching, lifting, carrying, performing household tasks I.e. vacuuming and sleeping.         PT Goal 4       Start:  01/15/25    Expected End:  03/16/25       Improve Quick DASH score by at least 15.91 points (MDIC).         PT Goal 5       Start:  01/15/25    Expected End:  03/16/25       Pt will demonstrate independence with HEP.

## 2025-01-27 ENCOUNTER — APPOINTMENT (OUTPATIENT)
Dept: ORTHOPEDIC SURGERY | Facility: HOSPITAL | Age: 75
End: 2025-01-27
Payer: MEDICARE

## 2025-01-30 ENCOUNTER — TREATMENT (OUTPATIENT)
Dept: PHYSICAL THERAPY | Facility: CLINIC | Age: 75
End: 2025-01-30
Payer: MEDICARE

## 2025-01-30 DIAGNOSIS — M25.511 RIGHT SHOULDER PAIN, UNSPECIFIED CHRONICITY: Primary | ICD-10-CM

## 2025-01-30 DIAGNOSIS — M75.101 TEAR OF RIGHT ROTATOR CUFF, UNSPECIFIED TEAR EXTENT, UNSPECIFIED WHETHER TRAUMATIC: ICD-10-CM

## 2025-01-30 PROCEDURE — 97110 THERAPEUTIC EXERCISES: CPT | Mod: GP,CQ | Performed by: PHYSICAL THERAPY ASSISTANT

## 2025-01-30 NOTE — PROGRESS NOTES
Physical Therapy    Physical Therapy Treatment    Patient Name: Megha Vivas  MRN: [id number]  Today's Date: 1/30/2025    Time Entry:   Time Calculation  Start Time: 1045  Stop Time: 1130  Time Calculation (min): 45 min     PT Therapeutic Procedures Time Entry  Therapeutic Exercise Time Entry: 40  Insurance : [employer] Medicare   Authorization /Certification / Visits allowed: med necc  Visit 2      Assessment:  Good return demo of HEP.  Plan:  Prone flexion and abduction , ER iso walkouts or S/L ER , as tolerated.     Current Problem    Problem List Items Addressed This Visit             ICD-10-CM    Right shoulder pain - Primary M25.511     Other Visit Diagnoses         Codes    Tear of right rotator cuff, unspecified tear extent, unspecified whether traumatic     M75.101          Subjective    Compliant w/ HEP.  Difficulty falling asleep and her shoulder wakes her up. Has scheduled a 6 weeks follow up w/ her MD, if needed.   Pain  5/10 R shoulder     Objective   Tactile cues required for serratus punch   Treatments:    Therapeutic Exercise:   Shoulder isometrics: ABD, ER x10 w/ 10 sec hold ea R  Rows blue tube x20  Serratus wall slides 2 x 10  Supine serratus punch #2 2 x 10   Table dusting cw/ccw + flexion slides x 20 reps each   Positioning supine and side lying educated in and performed for improved sleep .   OP EDUCATION:       Goals:  Active       PT Problem       PT Goal 1       Start:  01/15/25    Expected End:  03/16/25       Increase R shoulder AROM to 160 degrees flexion and abduction, extension to 60 degrees and functional IR reach to at least T7 to improve reaching overhead, behind head and behind back for ADLs.         PT Goal 2       Start:  01/15/25    Expected End:  03/16/25       Increase R shoulder MMT to 5/5 in flexion and at least 4+/5 in ER and abduction to improve lifting and carrying weighted objects.         PT Goal 3       Start:  01/15/25    Expected End:  03/16/25       Pt will report at least  75% decrease in R shoulder pain to improve bathing, reaching, lifting, carrying, performing household tasks I.e. vacuuming and sleeping.         PT Goal 4       Start:  01/15/25    Expected End:  03/16/25       Improve Quick DASH score by at least 15.91 points (MDIC).         PT Goal 5       Start:  01/15/25    Expected End:  03/16/25       Pt will demonstrate independence with HEP.

## 2025-02-05 ENCOUNTER — APPOINTMENT (OUTPATIENT)
Dept: ORTHOPEDIC SURGERY | Facility: CLINIC | Age: 75
End: 2025-02-05
Payer: MEDICARE

## 2025-02-06 ENCOUNTER — TREATMENT (OUTPATIENT)
Dept: PHYSICAL THERAPY | Facility: CLINIC | Age: 75
End: 2025-02-06
Payer: MEDICARE

## 2025-02-06 DIAGNOSIS — M25.511 RIGHT SHOULDER PAIN, UNSPECIFIED CHRONICITY: Primary | ICD-10-CM

## 2025-02-06 DIAGNOSIS — M75.101 TEAR OF RIGHT ROTATOR CUFF, UNSPECIFIED TEAR EXTENT, UNSPECIFIED WHETHER TRAUMATIC: ICD-10-CM

## 2025-02-06 PROCEDURE — 97110 THERAPEUTIC EXERCISES: CPT | Mod: GP,CQ | Performed by: PHYSICAL THERAPY ASSISTANT

## 2025-02-06 NOTE — PROGRESS NOTES
Physical Therapy    Physical Therapy Treatment    Patient Name: Megha Vivas  MRN: [id number]  Today's Date: 2/6/2025    Time Entry:   Time Calculation  Start Time: 1000  Stop Time: 1045  Time Calculation (min): 45 min     PT Therapeutic Procedures Time Entry  Therapeutic Exercise Time Entry: 40  Insurance : [employer] Medicare   Authorization /Certification / Visits allowed: med necc  Visit 3      Assessment:  Able to progress to prone exercises w/o c/o, but unable to extend higher than her hip in prone w/o symptoms.     Plan:  ER iso walkouts or S/L ER , as tolerated.       Problem List Items Addressed This Visit             ICD-10-CM    Right shoulder pain - Primary M25.511     Other Visit Diagnoses         Codes    Tear of right rotator cuff, unspecified tear extent, unspecified whether traumatic     M75.101          Subjective        Pain  3/10 R shoulder     Objective   Shoulder extension limited to neutral in prone   Treatments:  Therapeutic Exercise:   UBE 2'/2'  Shoulder isometrics: ABD, ER x10 w/ 10 sec hold ea R  Prone rows #2 x 10 ; only to trunk   Prone flexion x 10   Prone horizontal abduction x 10   Prone extension x 10 ; level w/ table   Wand extension x 10 w/ 2 sec hold   Rows blue tube x20--not today   Serratus wall slides 2 x 10; unable to lift off  Supine serratus punch #2 2 x 10   Cold pack x 10 min      Goals:  Active       PT Problem       PT Goal 1       Start:  01/15/25    Expected End:  03/16/25       Increase R shoulder AROM to 160 degrees flexion and abduction, extension to 60 degrees and functional IR reach to at least T7 to improve reaching overhead, behind head and behind back for ADLs.         PT Goal 2       Start:  01/15/25    Expected End:  03/16/25       Increase R shoulder MMT to 5/5 in flexion and at least 4+/5 in ER and abduction to improve lifting and carrying weighted objects.         PT Goal 3       Start:  01/15/25    Expected End:  03/16/25       Pt will report at least 75%  decrease in R shoulder pain to improve bathing, reaching, lifting, carrying, performing household tasks I.e. vacuuming and sleeping.         PT Goal 4       Start:  01/15/25    Expected End:  03/16/25       Improve Quick DASH score by at least 15.91 points (MDIC).         PT Goal 5       Start:  01/15/25    Expected End:  03/16/25       Pt will demonstrate independence with HEP.

## 2025-02-14 ENCOUNTER — TREATMENT (OUTPATIENT)
Dept: PHYSICAL THERAPY | Facility: CLINIC | Age: 75
End: 2025-02-14
Payer: MEDICARE

## 2025-02-14 DIAGNOSIS — M75.101 TEAR OF RIGHT ROTATOR CUFF, UNSPECIFIED TEAR EXTENT, UNSPECIFIED WHETHER TRAUMATIC: ICD-10-CM

## 2025-02-14 DIAGNOSIS — M25.511 RIGHT SHOULDER PAIN, UNSPECIFIED CHRONICITY: ICD-10-CM

## 2025-02-14 PROCEDURE — 97110 THERAPEUTIC EXERCISES: CPT | Mod: GP,CQ | Performed by: PHYSICAL THERAPY ASSISTANT

## 2025-02-14 NOTE — PROGRESS NOTES
Physical Therapy    Physical Therapy Treatment    Patient Name: Megha Vivas  MRN: [id number]  Today's Date: 2/14/2025  Time Entry:   Time Calculation  Start Time: 1135  Stop Time: 1215  Time Calculation (min): 40 min     PT Therapeutic Procedures Time Entry  Therapeutic Exercise Time Entry: 40  Insurance : [employer] Medicare   Authorization /Certification / Visits allowed: med necc  Visit 4      Assessment:  Sore after her [place] trip but able to progress resistance and/or reps for prone exercises . Due to fatigue, banded iso walkouts held until next session .     Plan:  ER iso walkouts or S/L ER , as tolerated.       Problem List Items Addressed This Visit             ICD-10-CM    Right shoulder pain M25.511     Other Visit Diagnoses         Codes    Tear of right rotator cuff, unspecified tear extent, unspecified whether traumatic     M75.101          Subjective    Compliant w/ HEP. Went swimming in [place] and her R shoulder was sore.     Pain  5/10 R shoulder     Objective   Standing AA shoulder ext 0~40 deg     Treatments:  Therapeutic Exercise:   UBE 2.5'/2.5'  Shoulder isometrics: ABD, ER 2x10 w/ 10 sec hold ea R  Standing R ext stretch w/ wand 3 sec hold x 10  reps R   Prone rows #2 x 10 ; only to trunk   Prone flexion #1 2 x 10   Prone horizontal abduction 2 x 10; cues for retraction    Prone extension#1 2 x 10 ; level w/ table   Wand extension x 10 w/ 2 sec hold --not today   Pulleys for flexion x 20 reps     Not today :   Rows blue tube x20   Serratus wall slides 2 x 10; unable to lift off  Supine serratus punch #2 2 x 10   Hot  pack x 10 min      Goals:  Active       PT Problem       PT Goal 1       Start:  01/15/25    Expected End:  03/16/25       Increase R shoulder AROM to 160 degrees flexion and abduction, extension to 60 degrees and functional IR reach to at least T7 to improve reaching overhead, behind head and behind back for ADLs.         PT Goal 2       Start:  01/15/25    Expected End:  03/16/25        Increase R shoulder MMT to 5/5 in flexion and at least 4+/5 in ER and abduction to improve lifting and carrying weighted objects.         PT Goal 3       Start:  01/15/25    Expected End:  03/16/25       Pt will report at least 75% decrease in R shoulder pain to improve bathing, reaching, lifting, carrying, performing household tasks I.e. vacuuming and sleeping.         PT Goal 4       Start:  01/15/25    Expected End:  03/16/25       Improve Quick DASH score by at least 15.91 points (MDIC).         PT Goal 5       Start:  01/15/25    Expected End:  03/16/25       Pt will demonstrate independence with HEP.

## 2025-02-20 ENCOUNTER — TREATMENT (OUTPATIENT)
Dept: PHYSICAL THERAPY | Facility: CLINIC | Age: 75
End: 2025-02-20
Payer: MEDICARE

## 2025-02-20 DIAGNOSIS — M75.101 TEAR OF RIGHT ROTATOR CUFF, UNSPECIFIED TEAR EXTENT, UNSPECIFIED WHETHER TRAUMATIC: ICD-10-CM

## 2025-02-20 DIAGNOSIS — M25.511 RIGHT SHOULDER PAIN, UNSPECIFIED CHRONICITY: ICD-10-CM

## 2025-02-20 PROCEDURE — 97110 THERAPEUTIC EXERCISES: CPT | Mod: GP | Performed by: PHYSICAL THERAPIST

## 2025-02-20 NOTE — PROGRESS NOTES
"Physical Therapy    Physical Therapy Treatment    Patient Name: Megha Vivas  MRN: [id number]  Today's Date: 2/20/2025  Time Entry:   Time Calculation  Start Time: 1000  Stop Time: 1055  Time Calculation (min): 55 min     PT Therapeutic Procedures Time Entry  Therapeutic Exercise Time Entry: 40  Insurance: [employer] [place], [place], no auth  Visit #5      Assessment:  Minimal change in shoulder abduction and extension AROM, however abduction pain-free now vs painful at initial session. Appropriately challenged with progression of ER strengthening. Pt may benefit from shoulder mobilizations.    Plan:  GH inferior and anterior glides.    Problem List Items Addressed This Visit             ICD-10-CM    Right shoulder pain M25.511     Other Visit Diagnoses         Codes    Tear of right rotator cuff, unspecified tear extent, unspecified whether traumatic     M75.101          Subjective    \"My shoulder doesn't ache as much at night. My bicep gets sore with the exercises.\"    Pain  3/10 R shoulder soreness     Objective   R shoulder AROM:   145 degrees abduction, pain-free  40 degrees extension, painful     Treatments:  Therapeutic Exercise:   UBE x 5 mins  Pulleys in flexion x 20  S/L ER 2x10 R  Prone rows 3# 2 x 10 R  Prone horizontal abduction 2 x 10 R  Prone flexion 1# 2 x 10 R  Prone extension 1# 2 x 10 R   Shoulder isometrics: ABD, ER 2x10 w/ 10 sec hold ea R  Standing ext stretch w/ wand x 10 R  ER walkouts, yellow band, x 10 R    Not today:   Rows blue tube x20   Serratus wall slides 2 x 10  Supine serratus punch 2# 2 x 10     Modalities:  Hot pack to R shoulder x 10 min EOS      Goals:  Active       PT Problem       PT Goal 1       Start:  01/15/25    Expected End:  03/16/25       Increase R shoulder AROM to 160 degrees flexion and abduction, extension to 60 degrees and functional IR reach to at least T7 to improve reaching overhead, behind head and behind back for ADLs.         PT Goal 2       Start:  01/15/25    " Expected End:  03/16/25       Increase R shoulder MMT to 5/5 in flexion and at least 4+/5 in ER and abduction to improve lifting and carrying weighted objects.         PT Goal 3       Start:  01/15/25    Expected End:  03/16/25       Pt will report at least 75% decrease in R shoulder pain to improve bathing, reaching, lifting, carrying, performing household tasks I.e. vacuuming and sleeping.         PT Goal 4       Start:  01/15/25    Expected End:  03/16/25       Improve Quick DASH score by at least 15.91 points (MDIC).         PT Goal 5       Start:  01/15/25    Expected End:  03/16/25       Pt will demonstrate independence with HEP.

## 2025-02-27 ENCOUNTER — TREATMENT (OUTPATIENT)
Dept: PHYSICAL THERAPY | Facility: CLINIC | Age: 75
End: 2025-02-27
Payer: MEDICARE

## 2025-02-27 DIAGNOSIS — M75.101 TEAR OF RIGHT ROTATOR CUFF, UNSPECIFIED TEAR EXTENT, UNSPECIFIED WHETHER TRAUMATIC: ICD-10-CM

## 2025-02-27 DIAGNOSIS — M25.511 RIGHT SHOULDER PAIN, UNSPECIFIED CHRONICITY: Primary | ICD-10-CM

## 2025-02-27 PROCEDURE — 97140 MANUAL THERAPY 1/> REGIONS: CPT | Mod: GP,CQ | Performed by: PHYSICAL THERAPY ASSISTANT

## 2025-02-27 PROCEDURE — 97110 THERAPEUTIC EXERCISES: CPT | Mod: GP,CQ | Performed by: PHYSICAL THERAPY ASSISTANT

## 2025-02-27 NOTE — PROGRESS NOTES
"Physical Therapy    Physical Therapy Treatment    Patient Name: Megha Vivas  MRN: [id number]  Today's Date: 2/27/2025  Time Entry:   Time Calculation  Start Time: 1000  Stop Time: 1045  Time Calculation (min): 45 min     PT Therapeutic Procedures Time Entry  Manual Therapy Time Entry: 10  Therapeutic Exercise Time Entry: 30  Insurance: [employer] [place], [place], no auth  Visit #6      Assessment:  Good increase in ER and flexion after manual. Challenged w/ ER walkouts , especially when forearm held in neutral.     Plan:  Continue w/ GH inferior and anterior glides. Focus on ER.     Problem List Items Addressed This Visit             ICD-10-CM    Right shoulder pain - Primary M25.511     Other Visit Diagnoses         Codes    Tear of right rotator cuff, unspecified tear extent, unspecified whether traumatic     M75.101          Subjective    Feels \"better\" and \"wonder if I should cancel the MRI.\" Compliant w/ HEP.     Pain  3/10 R shoulder soreness     Objective   R shoulder in supine , PROM:   ER 65 deg   Flexion 150 deg        Treatments:  Manual:  HP x 5 min seated: NC   Jt mobs inferior and anterior  PROM R shoulder all planes   Therapeutic Exercise:   UBE x 5 mins--not today        Pulleys in flexion x 20  Wand extension stretch 3 x 30 sec   Dennise extension stretch w/ R hand on plinth 5 reps x 2 sec hold   S/L ER 3x10 R  S/L hip abduction x 5 reps ; small ROM   Shoulder isometrics: ABD, ER 2x10 w/ 10 sec hold ea R  ER walkouts, yellow band, x 10 w/ 2 sec hold R    Not today:   Prone rows 3# 2 x 10 R  Prone horizontal abduction 2 x 10 R  Prone flexion 1# 2 x 10 R  Prone extension 1# 2 x 10 R   Rows blue tube x20   Serratus wall slides 2 x 10  Supine serratus punch 2# 2 x 10     Modalities:  Cold pack to R shoulder x 10 min EOS      Goals:  Active       PT Problem       PT Goal 1       Start:  01/15/25    Expected End:  03/16/25       Increase R shoulder AROM to 160 degrees flexion and abduction, extension to 60 " degrees and functional IR reach to at least T7 to improve reaching overhead, behind head and behind back for ADLs.         PT Goal 2       Start:  01/15/25    Expected End:  03/16/25       Increase R shoulder MMT to 5/5 in flexion and at least 4+/5 in ER and abduction to improve lifting and carrying weighted objects.         PT Goal 3       Start:  01/15/25    Expected End:  03/16/25       Pt will report at least 75% decrease in R shoulder pain to improve bathing, reaching, lifting, carrying, performing household tasks I.e. vacuuming and sleeping.         PT Goal 4       Start:  01/15/25    Expected End:  03/16/25       Improve Quick DASH score by at least 15.91 points (MDIC).         PT Goal 5       Start:  01/15/25    Expected End:  03/16/25       Pt will demonstrate independence with HEP.

## 2025-03-04 ENCOUNTER — OFFICE VISIT (OUTPATIENT)
Dept: ORTHOPEDIC SURGERY | Facility: CLINIC | Age: 75
End: 2025-03-04
Payer: MEDICARE

## 2025-03-04 DIAGNOSIS — M75.101 TEAR OF RIGHT ROTATOR CUFF, UNSPECIFIED TEAR EXTENT, UNSPECIFIED WHETHER TRAUMATIC: Primary | ICD-10-CM

## 2025-03-04 PROCEDURE — 1036F TOBACCO NON-USER: CPT | Performed by: ORTHOPAEDIC SURGERY

## 2025-03-04 PROCEDURE — 99213 OFFICE O/P EST LOW 20 MIN: CPT | Performed by: ORTHOPAEDIC SURGERY

## 2025-03-04 PROCEDURE — 1160F RVW MEDS BY RX/DR IN RCRD: CPT | Performed by: ORTHOPAEDIC SURGERY

## 2025-03-04 PROCEDURE — 1159F MED LIST DOCD IN RCRD: CPT | Performed by: ORTHOPAEDIC SURGERY

## 2025-03-04 NOTE — PROGRESS NOTES
Chief Complaint   Chief Complaint   Patient presents with    Right Shoulder - Pain, Follow-up         HPI:      Megha Vivas is a pleasant 74 y.o. year-old female who is seen today for in follow-up for right shoulder pain.  Injection therapy have been helping she has less discomfort.    Review of Systems all other body systems have been reviewed and are negative for complaint.    There were no vitals filed for this visit.    Past Medical History:   Diagnosis Date    Cataract     Hard to intubate     Hypertension     Vertigo      Patient Active Problem List   Diagnosis    Arthritis of knee, left    Primary osteoarthritis of left knee    Orthopedic aftercare    Left knee pain    Right shoulder pain       Medication Documentation Review Audit       Reviewed by Debi Cutler MA (Medical Assistant) on 03/04/25 at 1301      Medication Order Taking? Sig Documenting Provider Last Dose Status   amoxicillin (Amoxil) 500 mg capsule [id number]  Take 4 Tablets 1 Hour Prior to Dental Procedure or Cleaning. Kait Stanley PA-C  Active   cholecalciferol (Vitamin D-3) 50 MCG (2000 UT) tablet [id number] No Take 1 tablet by mouth once daily. Historical Provider, MD Past Week Active   fluticasone (Flonase) 50 mcg/actuation nasal spray [id number] No Administer 1 spray into each nostril once daily as needed for rhinitis. Shake gently. Before first use, prime pump. After use, clean tip and replace cap. Demetrio Dang MD 12/15/2023 Active   ibuprofen 200 mg tablet [id number] No Take 2 tablets by mouth every 6 hours if needed for mild pain (1 - 3). Demetrio Dang MD Past Week Active   loratadine (Claritin) 10 mg tablet [id number] No Take 1 tablet by mouth once daily as needed for allergies. Demetrio Dang MD 12/15/2023 Active   losartan-hydrochlorothiazide (Hyzaar) 50-12.5 mg tablet [id number] No Take 1 tablet by mouth once daily. Demetrio Dang MD 12/14/2023 Active   methylsulfonylmethane (MSM ORAL) [id number] No Take 1  capsule by mouth once daily. Historical Provider, MD Past Week Active   multivitamin tablet [id number] No Take 1 tablet by mouth once daily. Historical Provider, MD Past Week Active   ondansetron (Zofran) 4 mg tablet [id number]  Take 1 tablet (4 mg) by mouth every 8 hours if needed for nausea or vomiting. Ernie Simon MD  Active   pantoprazole (ProtoNix) 40 mg EC tablet [id number]  Take 1 tablet (40 mg) by mouth once daily in the morning. Take before meals. Do not crush, chew, or split. Ernie Simon MD   24 2356   TUMERIC-GING-OLIVE-OREG-CAPRYL ORAL [id number] No Take 1 tablet by mouth once daily. Historical Provider, MD Past Week Active                    Allergies   Allergen Reactions    Nickel Itching and Swelling    Pollen Extracts Cough and Runny nose       Social History     Socioeconomic History    Marital status:      Spouse name: Not on file    Number of children: Not on file    Years of education: Not on file    Highest education level: Not on file   Occupational History    Not on file   Tobacco Use    Smoking status: Never    Smokeless tobacco: Never   Vaping Use    Vaping status: Never Used   Substance and Sexual Activity    Alcohol use: Yes     Alcohol/week: 7.0 standard drinks of alcohol     Types: 7 Glasses of wine per week    Drug use: Yes     Types: Marijuana     Comment: vapes weekly    Sexual activity: Defer   Other Topics Concern    Not on file   Social History Narrative    Not on file     Social Drivers of Health     Financial Resource Strain: Low Risk  (3/2/2025)    Received from [facility]    Overall Financial Resource Strain (CARDIA)     Difficulty of Paying Living Expenses: Not hard at all   Food Insecurity: Unknown (3/2/2025)    Received from [facility]    Hunger Vital Sign     Worried About Running Out of Food in the Last Year: Never true     Ran Out of Food in the Last Year: Patient declined   Transportation Needs: No Transportation Needs (3/2/2025)     "Received from [facility] [employer]    PRAPARE - Transportation     Lack of Transportation (Medical): No     Lack of Transportation (Non-Medical): No   Physical Activity: Sufficiently Active (3/2/2025)    Received from [facility]    Exercise Vital Sign     Days of Exercise per Week: 5 days     Minutes of Exercise per Session: 30 min   Stress: No Stress Concern Present (3/2/2025)    Received from [facility]    [place] [employer] - Occupational Stress Questionnaire     Feeling of Stress : Only a little   Social Connections: Socially Integrated (3/2/2025)    Received from [employer] [facility] [employer]    Social Connection and Isolation Panel [NHANES]     Frequency of Communication with Friends and Family: Three times a week     Frequency of Social Gatherings with Friends and Family: Once a week     Attends [employer] Services: More than 4 times per year     Active Member of Clubs or Organizations: Yes     Attends Club or Organization Meetings: More than 4 times per year     Marital Status:    Intimate Partner Violence: Unknown (3/2/2025)    Received from [employer] [facility] [employer]    Humiliation, Afraid, Rape, and Kick questionnaire     Fear of Current or Ex-Partner: No     Emotionally Abused: Patient declined     Physically Abused: No     Sexually Abused: No   Housing Stability: Low Risk  (3/2/2025)    Received from [employer] [facility] [employer]    Housing Stability Vital Sign     Unable to Pay for Housing in the Last Year: No     Number of Times Moved in the Last Year: 0     Homeless in the Last Year: No       Past Surgical History:   Procedure Laterality Date    CATARACT EXTRACTION Left     HYSTERECTOMY      OTHER SURGICAL HISTORY      left victrectomy    OTHER SURGICAL HISTORY      bilateral breast reduction       There is no height or weight on file to calculate BMI.    HgA1c:   No results found for: \"HGBA1C\", \"[id number]\"    Physical Exam:  Constitutional: Well-developed well-nourished   Eyes: Sclerae anicteric, pupils equal and round  HENT: " Normocephalic atraumatic  Cardiovascular: Pulses full, regular rate and rhythm  Respiratory: Breathing not labored, no wheezing  Integumentary: Skin intact, no lesions or rashes  Neurological: Sensation intact, no gross strength deficits, reflexes equal  Psychiatric: Alert oriented and appropriate  Hematologic/lymphatic: No lymphadenopathy  Right shoulder: No mass swelling erythema no deformity full range of motion positive painful arc sign good strength positive speeds test          Imaging:  No new imaging        Impression/Plan:  Improving rotator cuff syndrome  Recommend continuation of physical therapy if symptoms worsen consider another injection and/or further imaging  Questions answered

## 2025-03-06 ENCOUNTER — TREATMENT (OUTPATIENT)
Dept: PHYSICAL THERAPY | Facility: CLINIC | Age: 75
End: 2025-03-06
Payer: MEDICARE

## 2025-03-06 DIAGNOSIS — M75.101 TEAR OF RIGHT ROTATOR CUFF, UNSPECIFIED TEAR EXTENT, UNSPECIFIED WHETHER TRAUMATIC: ICD-10-CM

## 2025-03-06 DIAGNOSIS — M25.511 RIGHT SHOULDER PAIN, UNSPECIFIED CHRONICITY: Primary | ICD-10-CM

## 2025-03-06 PROCEDURE — 97140 MANUAL THERAPY 1/> REGIONS: CPT | Mod: GP | Performed by: PHYSICAL THERAPIST

## 2025-03-06 PROCEDURE — 97110 THERAPEUTIC EXERCISES: CPT | Mod: GP | Performed by: PHYSICAL THERAPIST

## 2025-03-06 NOTE — PROGRESS NOTES
"Physical Therapy    Physical Therapy Treatment    Patient Name: Megha Vivas  MRN: [id number]  Today's Date: 3/6/2025  Time Entry:   Time Calculation  Start Time: 1000  Stop Time: 1045  Time Calculation (min): 45 min     PT Therapeutic Procedures Time Entry  Manual Therapy Time Entry: 10  Therapeutic Exercise Time Entry: 30  Insurance: [employer] [place], [place], no auth  Visit #7      Assessment:  Attempted un-resisted supine flexion raises, however pain elicited. Transitioned to sidelying flexion and pt responded well. Cues for proper ball placement during ER and ABD isometrics.    Plan:  Continue GHJ mobs, ER/ABD AROM and strengthening.    Problem List Items Addressed This Visit             ICD-10-CM    Right shoulder pain - Primary M25.511     Other Visit Diagnoses         Codes    Tear of right rotator cuff, unspecified tear extent, unspecified whether traumatic     M75.101          Subjective    \"I saw the MD yesterday and he said we didn't have to get an MRI because at my age it's probably going to show some tears anyway. If the pain doesn't get better in a month I'll have an injection.\"     Pain  3/10 R shoulder soreness     Objective   Treatments:  Manual Therapy:   Grade II-III R inferior and dorsal glides     Therapeutic Exercise:   UBE x 5 mins      Pulleys in flexion x 20  Wand extension stretch 3 x 30 sec   Dennise extension stretch w/ R hand on plinth x 10 w/ 10 sec hold R  S/L flexion raises x 10  S/L hip abduction x 10  S/L ER 2x10 R  Rows, blue tube, x 20   ER walkouts, yellow band, x 10 w/ 2 sec hold R  Serratus wall slides 2 x 10  Shoulder isometrics: ABD, ER x 10 w/ 10 sec hold ea R    Not today:   Prone rows 3# 2 x 10 R  Prone horizontal abduction 2 x 10 R  Prone flexion 1# 2 x 10 R  Prone extension 1# 2 x 10 R   Supine serratus punch 2# 2 x 10      Goals:  Active       PT Problem       PT Goal 1       Start:  01/15/25    Expected End:  03/16/25       Increase R shoulder AROM to 160 degrees flexion and " abduction, extension to 60 degrees and functional IR reach to at least T7 to improve reaching overhead, behind head and behind back for ADLs.         PT Goal 2       Start:  01/15/25    Expected End:  03/16/25       Increase R shoulder MMT to 5/5 in flexion and at least 4+/5 in ER and abduction to improve lifting and carrying weighted objects.         PT Goal 3       Start:  01/15/25    Expected End:  03/16/25       Pt will report at least 75% decrease in R shoulder pain to improve bathing, reaching, lifting, carrying, performing household tasks I.e. vacuuming and sleeping.         PT Goal 4       Start:  01/15/25    Expected End:  03/16/25       Improve Quick DASH score by at least 15.91 points (MDIC).         PT Goal 5       Start:  01/15/25    Expected End:  03/16/25       Pt will demonstrate independence with HEP.

## 2025-03-13 ENCOUNTER — TREATMENT (OUTPATIENT)
Dept: PHYSICAL THERAPY | Facility: CLINIC | Age: 75
End: 2025-03-13
Payer: MEDICARE

## 2025-03-13 DIAGNOSIS — M75.101 TEAR OF RIGHT ROTATOR CUFF, UNSPECIFIED TEAR EXTENT, UNSPECIFIED WHETHER TRAUMATIC: ICD-10-CM

## 2025-03-13 DIAGNOSIS — M25.511 RIGHT SHOULDER PAIN, UNSPECIFIED CHRONICITY: Primary | ICD-10-CM

## 2025-03-13 DIAGNOSIS — Z47.89 ORTHOPEDIC AFTERCARE: ICD-10-CM

## 2025-03-13 PROCEDURE — 97140 MANUAL THERAPY 1/> REGIONS: CPT | Mod: GP,CQ | Performed by: PHYSICAL THERAPY ASSISTANT

## 2025-03-13 PROCEDURE — 97110 THERAPEUTIC EXERCISES: CPT | Mod: GP,CQ | Performed by: PHYSICAL THERAPY ASSISTANT

## 2025-03-13 NOTE — PROGRESS NOTES
"Physical Therapy    Physical Therapy Treatment    Patient Name: Megha Vivas  MRN: [id number]  Today's Date: 3/13/2025  Time Entry:   Time Calculation  Start Time: 1050  Stop Time: 1135  Time Calculation (min): 45 min     PT Therapeutic Procedures Time Entry  Manual Therapy Time Entry: 10  Therapeutic Exercise Time Entry: 30  Insurance: [employer] [place], [place], no auth  Visit #9    Assessment:  Tolerated lift off with seated table slides w/o pain . Able to progress ER side lying reps and iso walkouts w/o pain.     Plan:  Re-eval.     Problem List Items Addressed This Visit             ICD-10-CM    Orthopedic aftercare Z47.89    Right shoulder pain - Primary M25.511     Other Visit Diagnoses         Codes    Tear of right rotator cuff, unspecified tear extent, unspecified whether traumatic     M75.101          Subjective    Compliant w/ HEP. \"Happy to pull pants on\" now.     Pain  3/10 R shoulder soreness     Objective   Treatments:  Manual Therapy:   Grade II-III R inferior and dorsal glides     Therapeutic Exercise:   UBE x 5 mins      Pulleys in flexion x 20  Wand extension stretch 3 x 30 sec   Dennise extension stretch w/ R hand on plinth x 10 w/ 10 sec hold R--not today   S/L flexion raises 2  x 10  S/L hip abduction 2 x 10  S/L ER 4 x 10 R  Rows, blue tube, x 20   ER walkouts, red band, x 10 w/ 2 sec hold R  Serratus wall slides 2 x 10; no lift off  Seated table slides w/ lift off at end range x 5 reps   Shoulder isometrics: ABD, ER x 10 w/ 10 sec hold ea R--not today     Goals:  Active       PT Problem       PT Goal 1       Start:  01/15/25    Expected End:  03/16/25       Increase R shoulder AROM to 160 degrees flexion and abduction, extension to 60 degrees and functional IR reach to at least T7 to improve reaching overhead, behind head and behind back for ADLs.         PT Goal 2       Start:  01/15/25    Expected End:  03/16/25       Increase R shoulder MMT to 5/5 in flexion and at least 4+/5 in ER and abduction " to improve lifting and carrying weighted objects.         PT Goal 3       Start:  01/15/25    Expected End:  03/16/25       Pt will report at least 75% decrease in R shoulder pain to improve bathing, reaching, lifting, carrying, performing household tasks I.e. vacuuming and sleeping.         PT Goal 4       Start:  01/15/25    Expected End:  03/16/25       Improve Quick DASH score by at least 15.91 points (MDIC).         PT Goal 5       Start:  01/15/25    Expected End:  03/16/25       Pt will demonstrate independence with HEP.

## 2025-03-27 ENCOUNTER — TREATMENT (OUTPATIENT)
Dept: PHYSICAL THERAPY | Facility: CLINIC | Age: 75
End: 2025-03-27
Payer: MEDICARE

## 2025-03-27 DIAGNOSIS — M75.101 TEAR OF RIGHT ROTATOR CUFF, UNSPECIFIED TEAR EXTENT, UNSPECIFIED WHETHER TRAUMATIC: ICD-10-CM

## 2025-03-27 DIAGNOSIS — M25.511 RIGHT SHOULDER PAIN, UNSPECIFIED CHRONICITY: Primary | ICD-10-CM

## 2025-03-27 PROCEDURE — 97110 THERAPEUTIC EXERCISES: CPT | Mod: GP | Performed by: PHYSICAL THERAPIST

## 2025-03-27 NOTE — PROGRESS NOTES
"Physical Therapy    Physical Therapy Treatment    Patient Name: Megha Vivas  MRN: [id number]  Today's Date: 3/27/2025  Time Entry:   Time Calculation  Start Time: 1430  Stop Time: 1515  Time Calculation (min): 45 min     PT Therapeutic Procedures Time Entry  Therapeutic Exercise Time Entry: 35  Insurance: [employer] [place], [place], no auth  Visit #10    Assessment:  Reassess shows some improvement in shoulder AROM and great improvement in shoulder MMT especially in abduction and ER, however shoulder pain continues to persist. At this time it is warranted to refer Megha back to ortho for follow up regarding continued shoulder pain despite improvements in AROM and strength.     Plan:  Referred back to ortho. Discharge to independent [facility].    Problem List Items Addressed This Visit             ICD-10-CM    Right shoulder pain - Primary M25.511     Other Visit Diagnoses         Codes    Tear of right rotator cuff, unspecified tear extent, unspecified whether traumatic     M75.101          Subjective    \"I'm very slowly getting better, 25% better. I can't put my coat on or wash my back without pain. I can pull my pants up which I couldn't do before. I try to avoid anything that causes pain.\"    Pain  3/10 R shoulder pain     Objective   Palpation: (-) TTP to R shoulder structures at rest     Observation: forward head, rounded shoulders, B scapular protraction     Shoulder AROM (R/L in degrees):   Flexion- 155 *painful / 160  Abduction- 150 *painful / 160  Extension- 40 *painful / 60  Functional ER reach- T3 / T3  Functional IR reach- T9 *painful / T5     Shoulder MMT (R/L):   Flexion- 4+/5; 5/5  Abduction- 4+/5; 5/5  ER- 4+/5; 4+/5  IR- 5/5; 5/5  Serratus anterior- 3+/5; 3+/5     Special tests:  Impingement cluster:  (+) Coleman/Micky  (+) Painful arc  (+) Infraspinatus MMT     Rotator cuff tear cluster:  (-) Drop arm   (+) Painful arc  (+) Infraspinatus MMT     Outcome Measures:  Quick DASH: 39 = " 63.64%    Treatments:  Therapeutic Exercise:   UBE x 5 mins      Pulleys in flexion x 20  Wand extension stretch 3 x 30 sec   Rows, blue tube, x 20   ER walkouts, red band, x 10 w/ 5 sec hold R  Serratus wall slides 2 x 10  Shoulder isometrics: ABD, ER x 10 w/ 10 sec hold ea R  S/L flexion raises 2  x 10 R  S/L hip abduction 2 x 10 R  S/L ER 2 x 10 R    Goals:  Active       PT Problem       PT Goal 1       Start:  01/15/25    Expected End:  03/16/25       Increase R shoulder AROM to 160 degrees flexion and abduction, extension to 60 degrees and functional IR reach to at least T7 to improve reaching overhead, behind head and behind back for ADLs.         PT Goal 2       Start:  01/15/25    Expected End:  03/16/25       Increase R shoulder MMT to 5/5 in flexion and at least 4+/5 in ER and abduction to improve lifting and carrying weighted objects.         PT Goal 3       Start:  01/15/25    Expected End:  03/16/25       Pt will report at least 75% decrease in R shoulder pain to improve bathing, reaching, lifting, carrying, performing household tasks I.e. vacuuming and sleeping.         PT Goal 4       Start:  01/15/25    Expected End:  03/16/25       Improve Quick DASH score by at least 15.91 points (MDIC).         PT Goal 5       Start:  01/15/25    Expected End:  03/16/25       Pt will demonstrate independence with HEP.

## 2025-04-01 ENCOUNTER — OFFICE VISIT (OUTPATIENT)
Dept: ORTHOPEDIC SURGERY | Facility: CLINIC | Age: 75
End: 2025-04-01
Payer: MEDICARE

## 2025-04-01 DIAGNOSIS — M75.101 TEAR OF RIGHT ROTATOR CUFF, UNSPECIFIED TEAR EXTENT, UNSPECIFIED WHETHER TRAUMATIC: ICD-10-CM

## 2025-04-01 DIAGNOSIS — M25.511 RIGHT SHOULDER PAIN, UNSPECIFIED CHRONICITY: Primary | ICD-10-CM

## 2025-04-01 PROCEDURE — 1036F TOBACCO NON-USER: CPT | Performed by: ORTHOPAEDIC SURGERY

## 2025-04-01 PROCEDURE — 20610 DRAIN/INJ JOINT/BURSA W/O US: CPT | Performed by: ORTHOPAEDIC SURGERY

## 2025-04-01 PROCEDURE — 1159F MED LIST DOCD IN RCRD: CPT | Performed by: ORTHOPAEDIC SURGERY

## 2025-04-01 PROCEDURE — 1160F RVW MEDS BY RX/DR IN RCRD: CPT | Performed by: ORTHOPAEDIC SURGERY

## 2025-04-01 PROCEDURE — 99213 OFFICE O/P EST LOW 20 MIN: CPT | Performed by: ORTHOPAEDIC SURGERY

## 2025-04-01 RX ORDER — METHYLPREDNISOLONE ACETATE 40 MG/ML
40 INJECTION, SUSPENSION INTRA-ARTICULAR; INTRALESIONAL; INTRAMUSCULAR; SOFT TISSUE
Status: COMPLETED | OUTPATIENT
Start: 2025-04-01 | End: 2025-04-01

## 2025-04-01 RX ORDER — LIDOCAINE HYDROCHLORIDE 20 MG/ML
2 INJECTION, SOLUTION INFILTRATION; PERINEURAL
Status: COMPLETED | OUTPATIENT
Start: 2025-04-01 | End: 2025-04-01

## 2025-04-01 RX ADMIN — METHYLPREDNISOLONE ACETATE 40 MG: 40 INJECTION, SUSPENSION INTRA-ARTICULAR; INTRALESIONAL; INTRAMUSCULAR; SOFT TISSUE at 09:44

## 2025-04-01 RX ADMIN — LIDOCAINE HYDROCHLORIDE 2 ML: 20 INJECTION, SOLUTION INFILTRATION; PERINEURAL at 09:44

## 2025-04-01 NOTE — PROGRESS NOTES
Chief Complaint   Chief Complaint   Patient presents with    Right Shoulder - Follow-up, Pain         HPI:      Megha Vivas is a pleasant 74 y.o. year-old female who is seen today for follow-up of right shoulder pain she is 50% better she is finishing physical therapy but doing at home she is not symptomatic enough to consider surgical intervention    Review of Systems all other body systems have been reviewed and are negative for complaint.    There were no vitals filed for this visit.    Past Medical History:   Diagnosis Date    Cataract     Hard to intubate     Hypertension     Vertigo      Patient Active Problem List   Diagnosis    Arthritis of knee, left    Primary osteoarthritis of left knee    Orthopedic aftercare    Left knee pain    Right shoulder pain       Medication Documentation Review Audit       Reviewed by Debi Cutler MA (Medical Assistant) on 04/01/25 at 0939      Medication Order Taking? Sig Documenting Provider Last Dose Status   amoxicillin (Amoxil) 500 mg capsule [id number]  Take 4 Tablets 1 Hour Prior to Dental Procedure or Cleaning. Kait Stanley PA-C  Active   cholecalciferol (Vitamin D-3) 50 MCG (2000 UT) tablet [id number] No Take 1 tablet by mouth once daily. Demetrio Dang MD Past Week Active   fluticasone (Flonase) 50 mcg/actuation nasal spray [id number] No Administer 1 spray into each nostril once daily as needed for rhinitis. Shake gently. Before first use, prime pump. After use, clean tip and replace cap. Demetrio Dang MD 12/15/2023 Active   ibuprofen 200 mg tablet [id number] No Take 2 tablets by mouth every 6 hours if needed for mild pain (1 - 3). Demetrio Dang MD Past Week Active   loratadine (Claritin) 10 mg tablet [id number] No Take 1 tablet by mouth once daily as needed for allergies. Demetrio Dang MD 12/15/2023 Active   losartan-hydrochlorothiazide (Hyzaar) 50-12.5 mg tablet [id number] No Take 1 tablet by mouth once daily. Demetrio Dang MD  2023 Active   methylsulfonylmethane (MSM ORAL) [id number] No Take 1 capsule by mouth once daily. Historical Provider, MD Past Week Active   multivitamin tablet [id number] No Take 1 tablet by mouth once daily. Historical Provider, MD Past Week Active   ondansetron (Zofran) 4 mg tablet [id number]  Take 1 tablet (4 mg) by mouth every 8 hours if needed for nausea or vomiting. Ernie Simon MD  Active   pantoprazole (ProtoNix) 40 mg EC tablet [id number]  Take 1 tablet (40 mg) by mouth once daily in the morning. Take before meals. Do not crush, chew, or split. Ernie Simon MD   24 [id number]   TUMERIC-GING-OLIVE-OREG-CAPRYL ORAL [id number] No Take 1 tablet by mouth once daily. Historical Provider, MD Past Week Active                    Allergies   Allergen Reactions    Nickel Itching and Swelling    Pollen Extracts Cough and Runny nose       Social History     Socioeconomic History    Marital status:      Spouse name: Not on file    Number of children: Not on file    Years of education: Not on file    Highest education level: Not on file   Occupational History    Not on file   Tobacco Use    Smoking status: Never    Smokeless tobacco: Never   Vaping Use    Vaping status: Never Used   Substance and Sexual Activity    Alcohol use: Yes     Alcohol/week: 7.0 standard drinks of alcohol     Types: 7 Glasses of wine per week    Drug use: Yes     Types: Marijuana     Comment: vapes weekly    Sexual activity: Defer   Other Topics Concern    Not on file   Social History Narrative    Not on file     Social Drivers of Health     Financial Resource Strain: Low Risk  (3/2/2025)    Received from [facility]    Overall Financial Resource Strain (CARDIA)     Difficulty of Paying Living Expenses: Not hard at all   Food Insecurity: Unknown (3/2/2025)    Received from [facility]    Hunger Vital Sign     Worried About Running Out of Food in the Last Year: Never true     Ran Out of Food in the Last Year: Patient  "declined   Transportation Needs: No Transportation Needs (3/2/2025)    Received from [facility]    PRAPARE - Transportation     Lack of Transportation (Medical): No     Lack of Transportation (Non-Medical): No   Physical Activity: Sufficiently Active (3/2/2025)    Received from [facility]    Exercise Vital Sign     Days of Exercise per Week: 5 days     Minutes of Exercise per Session: 30 min   Stress: No Stress Concern Present (3/2/2025)    Received from [facility]    [employer] [place] [employer] - Occupational Stress Questionnaire     Feeling of Stress : Only a little   Social Connections: Socially Integrated (3/2/2025)    Received from [facility]    Social Connection and Isolation Panel [NHANES]     Frequency of Communication with Friends and Family: Three times a week     Frequency of Social Gatherings with Friends and Family: Once a week     Attends [employer] Services: More than 4 times per year     Active Member of Clubs or Organizations: Yes     Attends Club or Organization Meetings: More than 4 times per year     Marital Status:    Intimate Partner Violence: Unknown (3/2/2025)    Received from [facility]    Humiliation, Afraid, Rape, and Kick questionnaire     Fear of Current or Ex-Partner: No     Emotionally Abused: Patient declined     Physically Abused: No     Sexually Abused: No   Housing Stability: Low Risk  (3/2/2025)    Received from [facility]    Housing Stability Vital Sign     Unable to Pay for Housing in the Last Year: No     Number of Times Moved in the Last Year: 0     Homeless in the Last Year: No       Past Surgical History:   Procedure Laterality Date    CATARACT EXTRACTION Left     HYSTERECTOMY      OTHER SURGICAL HISTORY      left victrectomy    OTHER SURGICAL HISTORY      bilateral breast reduction       There is no height or weight on file to calculate BMI.    HgA1c:   No results found for: \"HGBA1C\", \"[id number]\"    Physical Exam:  Constitutional: Well-developed " well-nourished   Eyes: Sclerae anicteric, pupils equal and round  HENT: Normocephalic atraumatic  Cardiovascular: Pulses full, regular rate and rhythm  Respiratory: Breathing not labored, no wheezing  Integumentary: Skin intact, no lesions or rashes  Neurological: Sensation intact, no gross strength deficits, reflexes equal  Psychiatric: Alert oriented and appropriate  Hematologic/lymphatic: No lymphadenopathy  Right shoulder: No mass swelling or deformity gets full elevation but has pain with resisted supraspinatus contraction          Imaging:  No new imaging        Impression/Plan:  Rotator cuff syndrome 50% improved she will continue physical therapy we injected shoulder today discussed further imaging if symptoms regress  L Inj/Asp: R subacromial bursa on 4/1/2025 9:44 AM  Indications: pain  Details: 21 G needle, lateral approach  Medications: 40 mg methylPREDNISolone acetate 40 mg/mL; 2 mL lidocaine 20 mg/mL (2 %)

## 2025-04-15 ENCOUNTER — APPOINTMENT (OUTPATIENT)
Dept: ORTHOPEDIC SURGERY | Facility: CLINIC | Age: 75
End: 2025-04-15
Payer: MEDICARE

## (undated) DEVICE — SUTURE, CTD, VICRYL, 2-0, UND, BR, CT-2

## (undated) DEVICE — TIP, SUCTION, YANKAUER, FLEXIBLE

## (undated) DEVICE — DRESSING, MEPILEX FOAM BORDER AG, SILVER, 4 X 4

## (undated) DEVICE — TRAY, DRY PREP, PREMIUM

## (undated) DEVICE — DRAPE, SHEET, U, W/ADHESIVE STRIP, IMPERVIOUS, 60 X 70 IN, DISPOSABLE, LF, STERILE

## (undated) DEVICE — Device

## (undated) DEVICE — SUTURE, MONOCRYL, 3-0, 27 IN, PS-2, UNDYED

## (undated) DEVICE — SUTURE, ETHIBOND, P2, V-37, 30 IN, GREEN

## (undated) DEVICE — SUTURE, VICRYL, 1, 27 IN, CT-1, VIOLET

## (undated) DEVICE — SYRINGE, 60 CC, IRRIGATION, BULB, CONTRO-BULB, PAPER POUCH

## (undated) DEVICE — DRAPE, INSTRUMENT, W/POUCH, STERI DRAPE, 7 X 11 IN, DISPOSABLE, STERILE

## (undated) DEVICE — SOLUTION, CHLORHEXIDINE, 4%, 4OZ

## (undated) DEVICE — GLOVE, SURGICAL, PROTEXIS PI , 8.0, PF, LF

## (undated) DEVICE — HOOD, SURGICAL, FLYTE HYBRID

## (undated) DEVICE — CUFF, TOURNIQUET, 30 X 4, DUAL PORT/SNGL BLADDER, DISP, LF

## (undated) DEVICE — CEMENT, MIXEVAC III, 10S BOWL, KNEES

## (undated) DEVICE — SOLUTION, TOPICAL, ALCOHOL, 70% ISOPROPYL, 4OZ

## (undated) DEVICE — BLANKET, LOWER BODY, VHA PLUS, ADULT

## (undated) DEVICE — BANDAGE, COFLEX, 6 X 5 YDS, TAN, STERILE, LF

## (undated) DEVICE — DRAPE, INCISE, ANTIMICROBIAL, IOBAN 2, STERI DRAPE, 23 X 33 IN, DISPOSABLE, STERILE

## (undated) DEVICE — STRIP, SKIN CLOSURE, STERI STRIP, REINFORCED, 0.5 X 4 IN